# Patient Record
Sex: FEMALE | Race: OTHER | Employment: FULL TIME | URBAN - METROPOLITAN AREA
[De-identification: names, ages, dates, MRNs, and addresses within clinical notes are randomized per-mention and may not be internally consistent; named-entity substitution may affect disease eponyms.]

---

## 2023-06-23 ENCOUNTER — LAB ENCOUNTER (OUTPATIENT)
Dept: LAB | Facility: HOSPITAL | Age: 48
End: 2023-06-23
Attending: OBSTETRICS & GYNECOLOGY
Payer: MEDICAID

## 2023-06-23 ENCOUNTER — OFFICE VISIT (OUTPATIENT)
Dept: OBGYN CLINIC | Facility: CLINIC | Age: 48
End: 2023-06-23

## 2023-06-23 VITALS
BODY MASS INDEX: 30.4 KG/M2 | SYSTOLIC BLOOD PRESSURE: 120 MMHG | HEIGHT: 61 IN | WEIGHT: 161 LBS | DIASTOLIC BLOOD PRESSURE: 81 MMHG

## 2023-06-23 DIAGNOSIS — N92.0 MENORRHAGIA WITH REGULAR CYCLE: Primary | ICD-10-CM

## 2023-06-23 DIAGNOSIS — N92.0 MENORRHAGIA WITH REGULAR CYCLE: ICD-10-CM

## 2023-06-23 DIAGNOSIS — N92.0 EXCESSIVE OR FREQUENT MENSTRUATION: ICD-10-CM

## 2023-06-23 DIAGNOSIS — D25.1 FIBROIDS, INTRAMURAL: ICD-10-CM

## 2023-06-23 DIAGNOSIS — Z01.812 PRE-PROCEDURAL LABORATORY EXAMINATION: ICD-10-CM

## 2023-06-23 LAB
CONTROL LINE PRESENT WITH A CLEAR BACKGROUND (YES/NO): YES YES/NO
DEPRECATED RDW RBC AUTO: 44.6 FL (ref 35.1–46.3)
ERYTHROCYTE [DISTWIDTH] IN BLOOD BY AUTOMATED COUNT: 13.7 % (ref 11–15)
HCT VFR BLD AUTO: 42.6 %
HGB BLD-MCNC: 14.1 G/DL
KIT LOT #: NORMAL NUMERIC
MCH RBC QN AUTO: 29.5 PG (ref 26–34)
MCHC RBC AUTO-ENTMCNC: 33.1 G/DL (ref 31–37)
MCV RBC AUTO: 89.1 FL
PLATELET # BLD AUTO: 286 10(3)UL (ref 150–450)
PREGNANCY TEST, URINE: NEGATIVE
RBC # BLD AUTO: 4.78 X10(6)UL
WBC # BLD AUTO: 6.3 X10(3) UL (ref 4–11)

## 2023-06-23 PROCEDURE — 88305 TISSUE EXAM BY PATHOLOGIST: CPT | Performed by: OBSTETRICS & GYNECOLOGY

## 2023-06-23 PROCEDURE — 36415 COLL VENOUS BLD VENIPUNCTURE: CPT

## 2023-06-23 PROCEDURE — 85027 COMPLETE CBC AUTOMATED: CPT

## 2023-06-23 RX ORDER — NYSTATIN 100000 U/G
CREAM TOPICAL
COMMUNITY
Start: 2023-06-12

## 2023-06-23 NOTE — PROCEDURES
Endometrial Biopsy     Pre-Procedure Care:   Consent was obtained. Procedure/risks were explained. Questions were answered. Correct patient was identified. Correct side and site were confirmed. Pregnancy Results: negative from urine test   Birth control method(s) used: Indication: menorrhagia    Pre-Medications: The patient was premedicated with . Description of Procedure:   A bivalve speculum was placed in the vagina and the cervix was prepped with betadine solution. Single tooth tenaculum placed at the 12 o'clock position. The uterine cavity was sounded at 11 cm. The endometrial cavity was curetted for pipelle tissue sampling with 3 passes. Specimen was sent to pathology. The single tooth tenaculum was removed. Silver nitrate was applied at the site of tenaculum application   Good hemostasis was noted. There were no complications. There was no blood loss. Discharge instructions were provided to the patient.     Visit Plan:

## 2023-07-05 ENCOUNTER — OFFICE VISIT (OUTPATIENT)
Dept: OBGYN CLINIC | Facility: CLINIC | Age: 48
End: 2023-07-05

## 2023-07-05 ENCOUNTER — TELEPHONE (OUTPATIENT)
Dept: OBGYN CLINIC | Facility: CLINIC | Age: 48
End: 2023-07-05

## 2023-07-05 VITALS
WEIGHT: 163 LBS | DIASTOLIC BLOOD PRESSURE: 86 MMHG | BODY MASS INDEX: 30.78 KG/M2 | HEIGHT: 61 IN | SYSTOLIC BLOOD PRESSURE: 142 MMHG

## 2023-07-05 DIAGNOSIS — D25.1 FIBROIDS, INTRAMURAL: ICD-10-CM

## 2023-07-05 DIAGNOSIS — D25.1 FIBROIDS, INTRAMURAL: Primary | ICD-10-CM

## 2023-07-05 DIAGNOSIS — N84.0 ENDOMETRIAL POLYP: ICD-10-CM

## 2023-07-05 DIAGNOSIS — N92.0 MENORRHAGIA WITH REGULAR CYCLE: Primary | ICD-10-CM

## 2023-07-05 DIAGNOSIS — N92.0 MENORRHAGIA WITH REGULAR CYCLE: ICD-10-CM

## 2023-07-05 PROCEDURE — 3079F DIAST BP 80-89 MM HG: CPT | Performed by: OBSTETRICS & GYNECOLOGY

## 2023-07-05 PROCEDURE — 3077F SYST BP >= 140 MM HG: CPT | Performed by: OBSTETRICS & GYNECOLOGY

## 2023-07-05 PROCEDURE — 99213 OFFICE O/P EST LOW 20 MIN: CPT | Performed by: OBSTETRICS & GYNECOLOGY

## 2023-07-05 PROCEDURE — 3008F BODY MASS INDEX DOCD: CPT | Performed by: OBSTETRICS & GYNECOLOGY

## 2023-07-05 NOTE — TELEPHONE ENCOUNTER
Please schedule the following surgery:    Procedure: Robotic TLH with bilateral salpingectomy  Assist: (Y/N or none) donchev  Date: 7/26 if possible  Dx:  menorrhagia, fibroid,   Pre-op appt: (Y/N or n/a) n  Admission type: (IN/OUT/OBVS) out  Department of discharge(SDS/Floor):   Expected length of stay:   Procedure length time (please enter amount you are requesting): 2.5 hours  Recovery time (patients always ask): 2 to 6 wks  Medical Clearance: (Y/N) n  Special Requests: n      ALL Medicaid/including BCBS community: Tubal/ Hyst form MUST be signed (30 days):     Message to nurses:

## 2023-07-05 NOTE — PROGRESS NOTES
Jakob Quinones is a [age] old female  Patient's last menstrual period was 06/10/2023. Patient presents with:  Gyn Exam: Endometrial bx  and cbc 23     Recent endometrial bx showed endometrial polyp. She has 2 small fibroids. No anemia. Pt c/o menses 10 days long. Ultrasound shows fibroids  OBSTETRICS HISTORY:     OB History    Para Term  AB Living   2 2 2         SAB IAB Ectopic Multiple Live Births                  # Outcome Date GA Lbr Santhosh/2nd Weight Sex Delivery Anes PTL Lv   2 Term      CS-LTranv      1 Term      Vag-Spont          GYNE HISTORY:         Menarche: 12 (2023 11:10 AM)  Period Cycle (Days): 28 (2023 11:10 AM)  Period Duration (Days): 7 (2023 11:10 AM)  Period Flow: painful and heavy (2023 11:10 AM)  Use of Birth Control (if yes, specify type): None (2023 11:10 AM)  Hx Prior Abnormal Pap: No (2023 11:10 AM)         No data to display                  MEDICAL HISTORY:     History reviewed. No pertinent past medical history. SURGICAL HISTORY:     Past Surgical History:   Procedure Laterality Date     DELIVERY ONLY         SOCIAL HISTORY:     Social History     Socioeconomic History    Marital status: Single   Tobacco Use    Smoking status: Never    Smokeless tobacco: Never        FAMILY HISTORY:     History reviewed. No pertinent family history.     MEDICATIONS:       Current Outpatient Medications:     nystatin 100,000 Units/g External Cream, APPLY 1GM ON THE AFFECTED AREA 2 TIMES DAILY, Disp: , Rfl:     ALLERGIES:     Not on File      REVIEW OF SYSTEMS:     Constitutional:    denies fever / chills  Eyes:     denies blurred or double vision  Cardiovascular:  denies chest pain or palpitations  Respiratory:    denies shortness of breath  Gastrointestinal:  denies severe abdominal pain, frequent diarrhea or constipation, nausea / vomiting  Genitourinary:    denies dysuria, bothersome incontinence      PHYSICAL EXAM: Blood pressure 142/86, height 5' 1\" (1.549 m), weight 163 lb (73.9 kg), last menstrual period 06/10/2023. Constitutional:  well developed, well nourished  Head/Face:  normocephalic  Neck/Thyroid: thyroid symmetric, no thyromegaly, no nodules, no adenopathy  Lymphatic: no abnormal supraclavicular or axillary adenopathy is noted  Abdomen:   soft, nontender, nondistended, no masses        ASSESSMENT & PLAN:     [place] was seen today for gyn exam.    Diagnoses and all orders for this visit:    Fibroids, intramural    Menorrhagia with regular cycle    Endometrial polyp    Plan: offered ocp (but she might still have polyps) so declined, vs novasure (declined) vs TLH . She wants TLH with both tubes removed,and will preserve ovaries. No fam hx breast or ovarian cancer. Risks of bowel damage with laparoscopy after prior  accepted by pt. Will schedule. No anemia per lab      FOLLOW-UP     No follow-ups on file.       Ludwig Branham MD  2023

## 2023-07-06 NOTE — TELEPHONE ENCOUNTER
Called and spoke to pt using  #[id number]. Pt agrees with surgery date.  Major case letter sent to pt's home address.     -Amanda Gordon to assist  -need HFS hysterectomy consent

## 2023-07-17 RX ORDER — TERBINAFINE HYDROCHLORIDE 250 MG/1
250 TABLET ORAL DAILY
COMMUNITY

## 2023-07-18 ENCOUNTER — LAB ENCOUNTER (OUTPATIENT)
Dept: LAB | Age: 48
End: 2023-07-18
Attending: OBSTETRICS & GYNECOLOGY
Payer: MEDICAID

## 2023-07-18 DIAGNOSIS — Z01.818 PREOP TESTING: ICD-10-CM

## 2023-07-18 LAB
ANTIBODY SCREEN: NEGATIVE
RH BLOOD TYPE: NEGATIVE

## 2023-07-18 PROCEDURE — 86900 BLOOD TYPING SEROLOGIC ABO: CPT

## 2023-07-18 PROCEDURE — 86901 BLOOD TYPING SEROLOGIC RH(D): CPT

## 2023-07-18 PROCEDURE — 86850 RBC ANTIBODY SCREEN: CPT

## 2023-07-18 PROCEDURE — 36415 COLL VENOUS BLD VENIPUNCTURE: CPT

## 2023-07-19 LAB — RH BLOOD TYPE: NEGATIVE

## 2023-07-26 ENCOUNTER — ANESTHESIA EVENT (OUTPATIENT)
Dept: SURGERY | Facility: HOSPITAL | Age: 48
End: 2023-07-26
Payer: MEDICAID

## 2023-07-26 ENCOUNTER — HOSPITAL ENCOUNTER (OUTPATIENT)
Facility: HOSPITAL | Age: 48
Setting detail: HOSPITAL OUTPATIENT SURGERY
Discharge: HOME | End: 2023-07-26
Attending: OBSTETRICS & GYNECOLOGY | Admitting: OBSTETRICS & GYNECOLOGY
Payer: MEDICAID

## 2023-07-26 ENCOUNTER — ANESTHESIA (OUTPATIENT)
Dept: SURGERY | Facility: HOSPITAL | Age: 48
End: 2023-07-26
Payer: MEDICAID

## 2023-07-26 VITALS
HEIGHT: 61 IN | DIASTOLIC BLOOD PRESSURE: 79 MMHG | OXYGEN SATURATION: 99 % | HEART RATE: 89 BPM | SYSTOLIC BLOOD PRESSURE: 124 MMHG | RESPIRATION RATE: 14 BRPM | TEMPERATURE: 97 F | WEIGHT: 163 LBS | BODY MASS INDEX: 30.78 KG/M2

## 2023-07-26 DIAGNOSIS — Z01.818 PREOP TESTING: Primary | ICD-10-CM

## 2023-07-26 DIAGNOSIS — N92.0 MENORRHAGIA WITH REGULAR CYCLE: ICD-10-CM

## 2023-07-26 DIAGNOSIS — D25.1 FIBROIDS, INTRAMURAL: ICD-10-CM

## 2023-07-26 LAB — B-HCG UR QL: NEGATIVE

## 2023-07-26 PROCEDURE — 0UT94ZZ RESECTION OF UTERUS, PERCUTANEOUS ENDOSCOPIC APPROACH: ICD-10-PCS | Performed by: OBSTETRICS & GYNECOLOGY

## 2023-07-26 PROCEDURE — 58571 TLH W/T/O 250 G OR LESS: CPT | Performed by: OBSTETRICS & GYNECOLOGY

## 2023-07-26 PROCEDURE — 8E0W4CZ ROBOTIC ASSISTED PROCEDURE OF TRUNK REGION, PERCUTANEOUS ENDOSCOPIC APPROACH: ICD-10-PCS | Performed by: OBSTETRICS & GYNECOLOGY

## 2023-07-26 PROCEDURE — 0UT74ZZ RESECTION OF BILATERAL FALLOPIAN TUBES, PERCUTANEOUS ENDOSCOPIC APPROACH: ICD-10-PCS | Performed by: OBSTETRICS & GYNECOLOGY

## 2023-07-26 RX ORDER — HYDROCODONE BITARTRATE AND ACETAMINOPHEN 5; 325 MG/1; MG/1
1-2 TABLET ORAL EVERY 4 HOURS PRN
Qty: 12 TABLET | Refills: 0 | Status: SHIPPED | OUTPATIENT
Start: 2023-07-26

## 2023-07-26 RX ORDER — ONDANSETRON 4 MG/1
4 TABLET, FILM COATED ORAL EVERY 8 HOURS PRN
Status: CANCELLED | OUTPATIENT
Start: 2023-07-26

## 2023-07-26 RX ORDER — ACETAMINOPHEN 325 MG/1
650 TABLET ORAL EVERY 6 HOURS PRN
Status: CANCELLED | OUTPATIENT
Start: 2023-07-26

## 2023-07-26 RX ORDER — KETOROLAC TROMETHAMINE 30 MG/ML
30 INJECTION, SOLUTION INTRAMUSCULAR; INTRAVENOUS ONCE
Status: COMPLETED | OUTPATIENT
Start: 2023-07-26 | End: 2023-07-26

## 2023-07-26 RX ORDER — SODIUM CHLORIDE, SODIUM LACTATE, POTASSIUM CHLORIDE, CALCIUM CHLORIDE 600; 310; 30; 20 MG/100ML; MG/100ML; MG/100ML; MG/100ML
INJECTION, SOLUTION INTRAVENOUS CONTINUOUS
Status: DISCONTINUED | OUTPATIENT
Start: 2023-07-26 | End: 2023-07-26

## 2023-07-26 RX ORDER — HYDROMORPHONE HYDROCHLORIDE 1 MG/ML
0.6 INJECTION, SOLUTION INTRAMUSCULAR; INTRAVENOUS; SUBCUTANEOUS EVERY 5 MIN PRN
Status: DISCONTINUED | OUTPATIENT
Start: 2023-07-26 | End: 2023-07-26

## 2023-07-26 RX ORDER — METOCLOPRAMIDE 10 MG/1
10 TABLET ORAL ONCE
Status: COMPLETED | OUTPATIENT
Start: 2023-07-26 | End: 2023-07-26

## 2023-07-26 RX ORDER — IBUPROFEN 600 MG/1
600 TABLET ORAL EVERY 6 HOURS
Status: CANCELLED | OUTPATIENT
Start: 2023-07-26

## 2023-07-26 RX ORDER — HYDROCODONE BITARTRATE AND ACETAMINOPHEN 5; 325 MG/1; MG/1
1 TABLET ORAL EVERY 6 HOURS PRN
Status: CANCELLED | OUTPATIENT
Start: 2023-07-26

## 2023-07-26 RX ORDER — HYDROMORPHONE HYDROCHLORIDE 1 MG/ML
0.4 INJECTION, SOLUTION INTRAMUSCULAR; INTRAVENOUS; SUBCUTANEOUS EVERY 5 MIN PRN
Status: DISCONTINUED | OUTPATIENT
Start: 2023-07-26 | End: 2023-07-26

## 2023-07-26 RX ORDER — MORPHINE SULFATE 10 MG/ML
6 INJECTION, SOLUTION INTRAMUSCULAR; INTRAVENOUS EVERY 10 MIN PRN
Status: DISCONTINUED | OUTPATIENT
Start: 2023-07-26 | End: 2023-07-26

## 2023-07-26 RX ORDER — ONDANSETRON 2 MG/ML
4 INJECTION INTRAMUSCULAR; INTRAVENOUS EVERY 6 HOURS PRN
Status: DISCONTINUED | OUTPATIENT
Start: 2023-07-26 | End: 2023-07-26

## 2023-07-26 RX ORDER — MIDAZOLAM HYDROCHLORIDE 1 MG/ML
INJECTION INTRAMUSCULAR; INTRAVENOUS AS NEEDED
Status: DISCONTINUED | OUTPATIENT
Start: 2023-07-26 | End: 2023-07-26 | Stop reason: SURG

## 2023-07-26 RX ORDER — ONDANSETRON 2 MG/ML
INJECTION INTRAMUSCULAR; INTRAVENOUS AS NEEDED
Status: DISCONTINUED | OUTPATIENT
Start: 2023-07-26 | End: 2023-07-26 | Stop reason: SURG

## 2023-07-26 RX ORDER — HYDROCODONE BITARTRATE AND ACETAMINOPHEN 5; 325 MG/1; MG/1
1 TABLET ORAL ONCE
Status: COMPLETED | OUTPATIENT
Start: 2023-07-26 | End: 2023-07-26

## 2023-07-26 RX ORDER — NALOXONE HYDROCHLORIDE 0.4 MG/ML
80 INJECTION, SOLUTION INTRAMUSCULAR; INTRAVENOUS; SUBCUTANEOUS AS NEEDED
Status: DISCONTINUED | OUTPATIENT
Start: 2023-07-26 | End: 2023-07-26

## 2023-07-26 RX ORDER — MORPHINE SULFATE 4 MG/ML
4 INJECTION, SOLUTION INTRAMUSCULAR; INTRAVENOUS EVERY 10 MIN PRN
Status: DISCONTINUED | OUTPATIENT
Start: 2023-07-26 | End: 2023-07-26

## 2023-07-26 RX ORDER — DEXAMETHASONE SODIUM PHOSPHATE 4 MG/ML
VIAL (ML) INJECTION AS NEEDED
Status: DISCONTINUED | OUTPATIENT
Start: 2023-07-26 | End: 2023-07-26 | Stop reason: SURG

## 2023-07-26 RX ORDER — BUPIVACAINE HYDROCHLORIDE 5 MG/ML
INJECTION, SOLUTION EPIDURAL; INTRACAUDAL AS NEEDED
Status: DISCONTINUED | OUTPATIENT
Start: 2023-07-26 | End: 2023-07-26 | Stop reason: HOSPADM

## 2023-07-26 RX ORDER — HYDROMORPHONE HYDROCHLORIDE 1 MG/ML
0.2 INJECTION, SOLUTION INTRAMUSCULAR; INTRAVENOUS; SUBCUTANEOUS EVERY 5 MIN PRN
Status: DISCONTINUED | OUTPATIENT
Start: 2023-07-26 | End: 2023-07-26

## 2023-07-26 RX ORDER — PROCHLORPERAZINE EDISYLATE 5 MG/ML
5 INJECTION INTRAMUSCULAR; INTRAVENOUS EVERY 8 HOURS PRN
Status: DISCONTINUED | OUTPATIENT
Start: 2023-07-26 | End: 2023-07-26

## 2023-07-26 RX ORDER — HYDROCODONE BITARTRATE AND ACETAMINOPHEN 5; 325 MG/1; MG/1
2 TABLET ORAL EVERY 6 HOURS PRN
Status: CANCELLED | OUTPATIENT
Start: 2023-07-26

## 2023-07-26 RX ORDER — ONDANSETRON 2 MG/ML
4 INJECTION INTRAMUSCULAR; INTRAVENOUS EVERY 8 HOURS PRN
Status: CANCELLED | OUTPATIENT
Start: 2023-07-26

## 2023-07-26 RX ORDER — ACETAMINOPHEN 500 MG
1000 TABLET ORAL ONCE
Status: COMPLETED | OUTPATIENT
Start: 2023-07-26 | End: 2023-07-26

## 2023-07-26 RX ORDER — ROCURONIUM BROMIDE 10 MG/ML
INJECTION, SOLUTION INTRAVENOUS AS NEEDED
Status: DISCONTINUED | OUTPATIENT
Start: 2023-07-26 | End: 2023-07-26 | Stop reason: SURG

## 2023-07-26 RX ORDER — CEFAZOLIN SODIUM/WATER 2 G/20 ML
SYRINGE (ML) INTRAVENOUS AS NEEDED
Status: DISCONTINUED | OUTPATIENT
Start: 2023-07-26 | End: 2023-07-26 | Stop reason: SURG

## 2023-07-26 RX ORDER — IBUPROFEN 600 MG/1
600 TABLET ORAL EVERY 6 HOURS PRN
Qty: 20 TABLET | Refills: 0 | Status: SHIPPED | OUTPATIENT
Start: 2023-07-26

## 2023-07-26 RX ORDER — LIDOCAINE HYDROCHLORIDE 10 MG/ML
INJECTION, SOLUTION EPIDURAL; INFILTRATION; INTRACAUDAL; PERINEURAL AS NEEDED
Status: DISCONTINUED | OUTPATIENT
Start: 2023-07-26 | End: 2023-07-26 | Stop reason: SURG

## 2023-07-26 RX ORDER — MORPHINE SULFATE 4 MG/ML
2 INJECTION, SOLUTION INTRAMUSCULAR; INTRAVENOUS EVERY 10 MIN PRN
Status: DISCONTINUED | OUTPATIENT
Start: 2023-07-26 | End: 2023-07-26

## 2023-07-26 RX ORDER — FAMOTIDINE 20 MG/1
20 TABLET, FILM COATED ORAL ONCE
Status: COMPLETED | OUTPATIENT
Start: 2023-07-26 | End: 2023-07-26

## 2023-07-26 RX ADMIN — LIDOCAINE HYDROCHLORIDE 50 MG: 10 INJECTION, SOLUTION EPIDURAL; INFILTRATION; INTRACAUDAL; PERINEURAL at 10:33:00

## 2023-07-26 RX ADMIN — DEXAMETHASONE SODIUM PHOSPHATE 8 MG: 4 MG/ML VIAL (ML) INJECTION at 10:40:00

## 2023-07-26 RX ADMIN — MIDAZOLAM HYDROCHLORIDE 2 MG: 1 INJECTION INTRAMUSCULAR; INTRAVENOUS at 10:24:00

## 2023-07-26 RX ADMIN — SODIUM CHLORIDE, SODIUM LACTATE, POTASSIUM CHLORIDE, CALCIUM CHLORIDE: 600; 310; 30; 20 INJECTION, SOLUTION INTRAVENOUS at 11:30:00

## 2023-07-26 RX ADMIN — ONDANSETRON 4 MG: 2 INJECTION INTRAMUSCULAR; INTRAVENOUS at 11:40:00

## 2023-07-26 RX ADMIN — CEFAZOLIN SODIUM/WATER 2 G: 2 G/20 ML SYRINGE (ML) INTRAVENOUS at 10:40:00

## 2023-07-26 RX ADMIN — ROCURONIUM BROMIDE 50 MG: 10 INJECTION, SOLUTION INTRAVENOUS at 10:33:00

## 2023-07-26 RX ADMIN — ROCURONIUM BROMIDE 10 MG: 10 INJECTION, SOLUTION INTRAVENOUS at 12:17:00

## 2023-07-26 RX ADMIN — SODIUM CHLORIDE, SODIUM LACTATE, POTASSIUM CHLORIDE, CALCIUM CHLORIDE: 600; 310; 30; 20 INJECTION, SOLUTION INTRAVENOUS at 12:25:00

## 2023-07-26 RX ADMIN — ROCURONIUM BROMIDE 10 MG: 10 INJECTION, SOLUTION INTRAVENOUS at 11:40:00

## 2023-07-26 RX ADMIN — SODIUM CHLORIDE, SODIUM LACTATE, POTASSIUM CHLORIDE, CALCIUM CHLORIDE: 600; 310; 30; 20 INJECTION, SOLUTION INTRAVENOUS at 13:05:00

## 2023-07-26 RX ADMIN — SODIUM CHLORIDE, SODIUM LACTATE, POTASSIUM CHLORIDE, CALCIUM CHLORIDE: 600; 310; 30; 20 INJECTION, SOLUTION INTRAVENOUS at 10:49:00

## 2023-07-26 NOTE — OPERATIVE REPORT
[facility]    Operative Note         [address] [place] Location: [facility] [id number] MRN [id number]   Admission Date 7/26/2023 Operation Date 7/26/2023   Attending Physician Genevieve Haney MD       Patient Name: [facility] Abdi Frost     Preoperative Diagnosis: Menorrhagia with regular cycle [N92.0]  Fibroids, intramural [D25.1] , pelvic pain    Postoperative Diagnosis: Menorrhagia with regular cycle [N04. 0]Fibroids, intramural [D25.1]     Procedure(s):  Robotic total laparoscopic hysterectomy with bilateral salpingectomy     Primary Surgeon: Naeem Malave MD     Surgical Assistant.: Stevenson Campbell     Anesthesia: General     Specimen:   ID Type Source Tests Collected by Time Destination   1 : 1. UTERUS, CERVIX, AND BILATERAL TUBES Tissue Uterus and cervix, bilateral tube SURGICAL PATHOLOGY TISSUE Genevieve Haney MD 7/26/2023 12:01 PM         Estimated Blood Loss: Blood Output: 25 mL (7/26/2023 54:47 PM)       Complications: none      Indications for procedure:      Surgical Findings: large fibroid uterus     Complexity:  (optional)    Operative Summary:       Implants: * No implants in log *     Drains: none     Condition: stable       Naeem Malave MD

## 2023-07-26 NOTE — ANESTHESIA PROCEDURE NOTES
Airway  Date/Time: 7/26/2023 10:34 AM  Urgency: Elective    Airway not difficult    General Information and Staff    Patient location during procedure: OR  Anesthesiologist: Fermín Rios MD  Resident/CRNA: Jj Blank CRNA  Performed: CRNA   Performed by: Jj Blank CRNA  Authorized by: Fermín Rios MD      Indications and Patient Condition  Indications for airway management: anesthesia  Sedation level: deep  Preoxygenated: yes  Patient position: sniffing  Mask difficulty assessment: 1 - vent by mask    Final Airway Details  Final airway type: endotracheal airway      Successful airway: ETT  Cuffed: yes   Successful intubation technique: direct laryngoscopy  Facilitating devices/methods: intubating stylet and cricoid pressure  Endotracheal tube insertion site: oral  Blade: Lisy  Blade size: #3  ETT size (mm): 7.0    Cormack-Lehane Classification: grade I - full view of glottis  Placement verified by: capnometry   Cuff volume (mL): 6  Measured from: teeth  ETT to teeth (cm): 19  Number of attempts at approach: 1

## 2023-07-27 NOTE — OPERATIVE REPORT
[facility]    PATIENT'S NAME: HERB Dejesus   ATTENDING PHYSICIAN: John France MD   OPERATING PHYSICIAN: John France MD   PATIENT ACCOUNT#:   [de-identified]    LOCATION:  [place] [address]  MEDICAL RECORD #:   [id number]       YOB: 1975  ADMISSION DATE:       07/26/2023      OPERATION DATE:  07/26/2023    OPERATIVE REPORT    #####EDITING MAY BE REQUIRED#####    PREOPERATIVE DIAGNOSIS:  Menorrhagia, fibroid, pelvic pain. POSTOPERATIVE DIAGNOSIS:  Menorrhagia, fibroid, pelvic pain. PROCEDURE:  Robotic total laparoscopic hysterectomy with bilateral salpingectomy. ASSISTANT:  VINOD Pritchett    ANESTHESIA:  General.    QUANTITATIVE BLOOD LOSS:  25 mL. COMPLICATIONS:  None. INDICATIONS:  Patient with a history of menorrhagia, pelvic pain, and fibroids. She consented for TLH and bilateral salpingectomy. OPERATIVE TECHNIQUE:   Patient taken to the operating room, given general anesthesia and placed in the dorsal lithotomy position, prepped and draped vaginally and abdominally. Vaginally, the Man catheter inserted after extra Betadine was added to the vagina. The anterior lip of the cervix was grasped with a single-tooth tenaculum and a 4 size Koh ring fit well on the cervix. Therefore, the cervix was dilated to #8 Hegar and sounded to 10 cm. Therefore, the 8 cm JENNIFER manipulator was attached to the 4 cm Koh ring, and this was inserted into the fundus, and the 6 mL normal saline balloon syringe was inflated. The single-tooth tenaculum was removed, and I pushed forward the Koh ring over the cervix. Attention was then turned to the abdomen. A supraumbilical incision was made with a scalpel, and the Veress needle inserted and abdomen filled with 5 L of CO2 followed by the 8 mm cannula robotic. The scope was inserted. There were some adhesions between the uterus and the anterior peritoneum.   I then placed the patient in steep Trendelenburg and placed a right-sided 8 mm port with a scalpel under direct visualization and a left-sided 8 mm port, and a left upper quadrant 8 mm AirSeal port which was used throughout. I then docked the robot and went to the console. With the vessel sealer in my right hand, I used a left arm was 1, arm 2 was the camera arm, 3 was the right quadrant, and assistant port was in the left upper quadrant and triangulated. With the vessel sealer in the right hand and the fenestrated bipolar in the left hand, I noticed that there was adhesion between the uterus and the anterior abdominal wall near the bladder. I removed the uterus _______  bladder using the vessel sealer. I then noticed there were a lot of adhesions between the bladder and the lower uterine segment, and I backfilled the bladder with 150 mL, and I saw where the junction was between the bladder and the lower uterine segment, and I used the scissors. Using the monopolar, I incised above the level of the bladder insertion into the cervix, and then I pulled the bladder down below the [facility] as I was incising with 1 tip of the monopolar scissors using cutting mode. I then put the vessel sealer back in my right hand and went across the proper ovarian ligament on the right side and then across the round ligament on the right side, then across the broad ligament on the right side, then down across the right ureter, always pushing up on the [facility] ring. I did not cut, because I went to the other side where I put the vessel sealer in my left hand and the fenestrated bipolar in the my right, and I similarly went across the proper ovarian ligament, across the left round ligament, down to through the broad ligament, and angled the right end over the ureter above the Koh ring. I then went to the right side and did cut and incise that uterine artery as well.   I then with scissors in the my right hand and the bipolar in my left hand, I went posteriorly and incised under the [facility] ring using a paint brush motion. I incised the end of the end of the [facility] ring, and then I followed by [facility] circumferentially using the monopolar scissors using coagulation mode. I was able to separate the uterus and cervix and vagina. The uterus was enlarged. Therefore, vaginally I had to grasp it with a single-tooth tenaculum, and I grabbed the posterior lower uterine segment then kept tumbling until I was able to remove the uterus intact without having to morcellate. I sent this as a specimen. I put a light gannon with a sponge inside to maintain pneumoperitoneum in the vagina as I went back to the console, and I was able to laparoscopically close the cuff after suction and irrigation of the pelvis was undertaken. Good hemostasis noted. I used the 9-inch 90 2-0 V-Loc and I started from the patient's left side, I got the corner anterior vaginal mucosa, posterior vaginal mucosa, posterior peritoneum and then I ran it from the patient's left to the right side anterior vaginal mucosa, posterior vaginal mucosa, posterior peritoneum including the uterosacral ligaments. I went _______ on the right side corner. I then ran the suture back toward the midline 2 times and cut the string of the V-Loc. I suction irrigated. Good hemostasis was noted. Both ovaries were normal.  I then removed the fallopian tubes with the vessel sealer in my right hand and the needle gannon in my left. For the suture portion, I used the aury needle gannon 9 cutting for manipulation of the V-Loc. I then removed the tubes with the vessel sealer in their entirety, and they were pulled out through the assistant port and sent to Pathology. Good hemostasis was noted. The ovaries were normal.  We suctioned and irrigated the pelvis, and good hemostasis was noted.   Vaginally, I then inserted a new 5 mm scope after I had removed the Man through the urethra and into the bladder after giving 1 mL sodium fluorescein and I saw green urine effluxing through both ureteral orifices. I then reinserted the Man and inspected the vagina. There was a small laceration on the left mid vagina which was sutured with interrupted 3-0 Vicryl on an SH needle. In the midline on the lower uterine segment posterior there was also a laceration which was sutured with interrupted 3-0 Vicryl on an SH needle as well. These oozings were from when we pulled the uterus out through the vagina. After that, good hemostasis was noted from the cuff. The 4 incision sites after the ports were removed were closed with interrupted 5-0 Monocryl. Sponge, lap, and needle counts correct x2. The patient tolerated the procedure well.       Dictated By Jarad Camacho MD  d: 07/26/2023 13:12:22  t: 07/26/2023 16:46:00  Job [id number]  ANGELIAY/

## 2023-07-28 PROBLEM — D25.1 FIBROIDS, INTRAMURAL: Status: ACTIVE | Noted: 2023-07-28

## 2023-07-28 PROBLEM — N92.4 MENORRHAGIA, PREMENOPAUSAL: Status: ACTIVE | Noted: 2023-07-28

## 2023-08-01 ENCOUNTER — MED REC SCAN ONLY (OUTPATIENT)
Dept: OBGYN CLINIC | Facility: CLINIC | Age: 48
End: 2023-08-01

## 2023-08-03 ENCOUNTER — TELEPHONE (OUTPATIENT)
Dept: OBGYN CLINIC | Facility: CLINIC | Age: 48
End: 2023-08-03

## 2023-08-03 NOTE — TELEPHONE ENCOUNTER
Patient name and  verified. Patient scheduled for postop with GERARDO on 23. Aware of scheduling details.

## 2023-08-03 NOTE — TELEPHONE ENCOUNTER
Pt scheduled 2 week Post Op appointment for 8/31 (soonest available). Surgery was 7/26. If this is too late, please reschedule with Pt. Needs .

## 2023-08-08 ENCOUNTER — TELEPHONE (OUTPATIENT)
Dept: OBGYN CLINIC | Facility: CLINIC | Age: 48
End: 2023-08-08

## 2023-08-08 NOTE — TELEPHONE ENCOUNTER
Mona Benson from [employer]illing wants to know if pt has a medicaid hysterectomy form for hysterectomy on 7/26

## 2023-08-09 ENCOUNTER — OFFICE VISIT (OUTPATIENT)
Dept: OBGYN CLINIC | Facility: CLINIC | Age: 48
End: 2023-08-09

## 2023-08-09 VITALS
SYSTOLIC BLOOD PRESSURE: 108 MMHG | WEIGHT: 157 LBS | DIASTOLIC BLOOD PRESSURE: 63 MMHG | HEIGHT: 63 IN | BODY MASS INDEX: 27.82 KG/M2

## 2023-08-09 DIAGNOSIS — Z09 POSTOP CHECK: Primary | ICD-10-CM

## 2023-08-09 PROCEDURE — 3078F DIAST BP <80 MM HG: CPT | Performed by: OBSTETRICS & GYNECOLOGY

## 2023-08-09 PROCEDURE — 3008F BODY MASS INDEX DOCD: CPT | Performed by: OBSTETRICS & GYNECOLOGY

## 2023-08-09 PROCEDURE — 3074F SYST BP LT 130 MM HG: CPT | Performed by: OBSTETRICS & GYNECOLOGY

## 2023-08-09 NOTE — PROGRESS NOTES
Dania Sutton is a 53year old female  Patient's last menstrual period was 07/10/2023. Patient presents with:  Post-Op: Robotic total laparoscopic hysterectomy with bilateral salpingectomy    Pt 14 days post tlh  OBSTETRICS HISTORY:     OB History    Para Term  AB Living   2 2 2         SAB IAB Ectopic Multiple Live Births                  # Outcome Date GA Lbr Santhosh/2nd Weight Sex Delivery Anes PTL Lv   2 Term 2012     CS-LTranv      1 Term 0     Vag-Spont          GYNE HISTORY:         Menarche: 12 (2023 11:10 AM)  Period Cycle (Days): 28 (2023 11:10 AM)  Period Duration (Days): 7 (2023 11:10 AM)  Period Flow: painful and heavy (2023 11:10 AM)  Use of Birth Control (if yes, specify type): None (2023 11:10 AM)  Hx Prior Abnormal Pap: No (2023 11:10 AM)         No data to display                  MEDICAL HISTORY:     Past Medical History:   Diagnosis Date    History of blood transfusion     age 3       SURGICAL HISTORY:     Past Surgical History:   Procedure Laterality Date     DELIVERY ONLY      HYSTERECTOMY  2023    Robotic total laparoscopic hysterectomy with bilateral salpingectomy    TONSILLECTOMY         SOCIAL HISTORY:     Social History     Socioeconomic History    Marital status: Single   Tobacco Use    Smoking status: Never    Smokeless tobacco: Never   Vaping Use    Vaping Use: Never used   Substance and Sexual Activity    Alcohol use: Never    Drug use: Never        FAMILY HISTORY:     Family History   Problem Relation Age of Onset    No Known Problems Father     Diabetes Mother        MEDICATIONS:       Current Outpatient Medications:     ibuprofen 600 MG Oral Tab, Take 1 tablet (600 mg total) by mouth every 6 (six) hours as needed for Pain., Disp: 20 tablet, Rfl: 0    terbinafine 250 MG Oral Tab, Take 1 tablet (250 mg total) by mouth daily.  (Patient not taking: Reported on 2023), Disp: , Rfl:     ALLERGIES:     No Known Allergies      REVIEW OF SYSTEMS:     Constitutional:    denies fever / chills  Eyes:     denies blurred or double vision  Cardiovascular:  denies chest pain or palpitations  Respiratory:    denies shortness of breath  Gastrointestinal:  denies severe abdominal pain, frequent diarrhea or constipation, nausea / vomiting  Genitourinary:    denies dysuria, bothersome incontinence  Skin/Breast:   denies any breast pain, lumps, or discharge  Neurological:    denies frequent severe headaches  Psychiatric:   denies depression or anxiety, thoughts of harming self or others  Heme/Lymph:    denies easy bruising or bleeding      PHYSICAL EXAM:   Blood pressure 108/63, height 5' 3\" (1.6 m), weight 157 lb (71.2 kg), last menstrual period 07/10/2023. Constitutional:  well developed, well nourished  Head/Face:  normocephalic  Neck/Thyroid: thyroid symmetric, no thyromegaly, no nodules, no adenopathy  Lymphatic: no abnormal supraclavicular or axillary adenopathy is noted  Breast:   normal without palpable masses, tenderness, asymmetry, nipple discharge, nipple retraction or skin changes  Abdomen:   soft, nontender, nondistended, no masses, incision healed x 4  Skin/Hair:  no unusual rashes or bruises  Extremities:  no edema, no cyanosis, non tender bilaterally  Psychiatric:   oriented to time, place, person and situation. Appropriate mood and affect          ASSESSMENT & PLAN:     [place] was seen today for post-op. Diagnoses and all orders for this visit:    Postop check  Rtc 4 wks, no c/o        FOLLOW-UP     Return in about 4 weeks (around 9/6/2023) for postop visit.       Chika Falcon MD  8/9/2023

## 2023-09-01 ENCOUNTER — OFFICE VISIT (OUTPATIENT)
Dept: OBGYN CLINIC | Facility: CLINIC | Age: 48
End: 2023-09-01

## 2023-09-01 VITALS
WEIGHT: 157 LBS | SYSTOLIC BLOOD PRESSURE: 110 MMHG | DIASTOLIC BLOOD PRESSURE: 72 MMHG | HEIGHT: 61 IN | BODY MASS INDEX: 29.64 KG/M2

## 2023-09-01 DIAGNOSIS — Z09 POSTOP CHECK: Primary | ICD-10-CM

## 2023-09-01 PROCEDURE — 3008F BODY MASS INDEX DOCD: CPT | Performed by: OBSTETRICS & GYNECOLOGY

## 2023-09-01 PROCEDURE — 3074F SYST BP LT 130 MM HG: CPT | Performed by: OBSTETRICS & GYNECOLOGY

## 2023-09-01 PROCEDURE — 3078F DIAST BP <80 MM HG: CPT | Performed by: OBSTETRICS & GYNECOLOGY

## 2024-04-03 ENCOUNTER — TELEPHONE (OUTPATIENT)
Dept: OBGYN CLINIC | Facility: CLINIC | Age: 49
End: 2024-04-03

## 2024-04-03 ENCOUNTER — OFFICE VISIT (OUTPATIENT)
Dept: OBGYN CLINIC | Facility: CLINIC | Age: 49
End: 2024-04-03
Payer: MEDICAID

## 2024-04-03 VITALS
WEIGHT: 153 LBS | SYSTOLIC BLOOD PRESSURE: 121 MMHG | BODY MASS INDEX: 29 KG/M2 | DIASTOLIC BLOOD PRESSURE: 72 MMHG | HEART RATE: 82 BPM

## 2024-04-03 DIAGNOSIS — Z12.31 BREAST CANCER SCREENING BY MAMMOGRAM: ICD-10-CM

## 2024-04-03 DIAGNOSIS — Z01.419 NORMAL GYNECOLOGIC EXAMINATION: Primary | ICD-10-CM

## 2024-04-03 DIAGNOSIS — N75.0 CYST OF LEFT BARTHOLIN'S GLAND: ICD-10-CM

## 2024-04-03 DIAGNOSIS — N75.0 CYST OF LEFT BARTHOLIN'S GLAND: Primary | ICD-10-CM

## 2024-04-03 PROCEDURE — 99396 PREV VISIT EST AGE 40-64: CPT | Performed by: OBSTETRICS & GYNECOLOGY

## 2024-04-03 NOTE — PROGRESS NOTES
Chyna Frost is a 49 year old female  Patient's last menstrual period was 07/10/2023.   Chief Complaint   Patient presents with    Annual     Pt presents for annual exam, c/o vaginal bump   No vaginal discharge. S/p tlh.     OBSTETRICS HISTORY:     OB History    Para Term  AB Living   3 2 2   1     SAB IAB Ectopic Multiple Live Births   1              # Outcome Date GA Lbr Santhosh/2nd Weight Sex Delivery Anes PTL Lv   3 Term 2012     CS-LTranv      2 SAB 2010           1 Term      Vag-Spont          GYNE HISTORY:     Hx Prior Abnormal Pap: No  Pap Result Notes:  normal per pt   Menarche: 13 (4/3/2024 10:03 AM)  Period Cycle (Days): Hysterectomy (4/3/2024 10:03 AM)  Period Duration (Days): 7 (2023 11:10 AM)  Period Flow: painful and heavy (2023 11:10 AM)  Use of Birth Control (if yes, specify type): Hysterectomy (4/3/2024 10:03 AM)  Hx Prior Abnormal Pap: No (4/3/2024 10:03 AM)  Pap Result Notes:  normal per pt (4/3/2024 10:03 AM)         No data to display                  MEDICAL HISTORY:     Past Medical History:   Diagnosis Date    History of blood transfusion     age 3       SURGICAL HISTORY:     Past Surgical History:   Procedure Laterality Date     DELIVERY ONLY      HYSTERECTOMY  2023    Robotic total laparoscopic hysterectomy with bilateral salpingectomy    TONSILLECTOMY         SOCIAL HISTORY:     Social History     Socioeconomic History    Marital status: Single   Tobacco Use    Smoking status: Never    Smokeless tobacco: Never   Vaping Use    Vaping Use: Never used   Substance and Sexual Activity    Alcohol use: Never    Drug use: Never        FAMILY HISTORY:     Family History   Problem Relation Age of Onset    No Known Problems Father     Diabetes Mother        MEDICATIONS:     No current outpatient medications on file.    ALLERGIES:     No Known Allergies      REVIEW OF SYSTEMS:     Constitutional:    denies fever / chills  Eyes:      denies blurred or double vision  Cardiovascular:  denies chest pain or palpitations  Respiratory:    denies shortness of breath  Gastrointestinal:  denies severe abdominal pain, frequent diarrhea or constipation, nausea / vomiting  Genitourinary:    denies dysuria, bothersome incontinence  Skin/Breast:   denies any breast pain, lumps, or discharge  Neurological:    denies frequent severe headaches  Psychiatric:   denies depression or anxiety, thoughts of harming self or others  Heme/Lymph:    denies easy bruising or bleeding      PHYSICAL EXAM:   Blood pressure 121/72, pulse 82, weight 153 lb (69.4 kg), last menstrual period 07/10/2023.  Constitutional:  well developed, well nourished  Head/Face:  normocephalic  Neck/Thyroid: thyroid symmetric, no thyromegaly, no nodules, no adenopathy  Lymphatic: no abnormal supraclavicular or axillary adenopathy is noted  Respiratory:      chest wall symmetric and nontender on palpation, clear to asculation bilateral, no wheezing, rales, ronchi, and resonance normal upon percussion  Cardiovascular: chest normal in appearance, regular rate and rhythm, no murmurs, PMI palpated midclavicular line  Breast:   normal without palpable masses, tenderness, asymmetry, nipple discharge, nipple retraction or skin changes  Abdomen:   soft, nontender, nondistended, no masses  Skin/Hair:  no unusual rashes or bruises  Extremities:  no edema, no cyanosis, non tender bilaterally  Psychiatric:   oriented to time, place, person and situation. Appropriate mood and affect    Pelvic Exam:  External Genitalia:  normal appearance, hair distribution, and no lesions, left bartholins cyst, 3x3 cm , mildly tender (pt states has had for 6 mos)  Urethral Meatus:   normal in size, location, without lesions   Bladder:    no fullness, masses or tenderness  Vagina:    normal appearance without lesions, no abnormal discharge  Sve: no pelvic masses    ASSESSMENT & PLAN:     Chyna was seen today for  annual.    Diagnoses and all orders for this visit:    Normal gynecologic examination  -     ThinPrep Pap with HPV Reflex, Chlamydia/GC; Future  -     Chlamydia/Gc Amplification; Future  Nutrition, weight screening and exercise were discussed with the patient.  Exercise should encompass approximately 150 minutes/week.  Self breast exam counseling was also given.  I advised the patient to avoid tobacco, drugs and alcohol.  Influenza vaccine was offered if seasonally appropriate.  HPV and STD prevention counseling was given.  Health maintenance checklist  was reviewed including Pap smear, cervical cultures, and mammogram screening if age-appropriate.  Appropriate follow-up scheduling was discussed with the patient.    Pap cuff done  Breast cancer screening by mammogram  -     [facility] EDUARDO 2D+3D SCREENING BILAT (CPT=77067/36480); Future    Cyst of left Bartholin's gland  I recommend surgical excision under general . Pt agreed . D/w pt marsupialization and that this can recur after surgery and that hematoma is  a risks of this surgery. She agreed to the risks. Will schedule        FOLLOW-UP     Return in about 1 year (around 4/3/2025) for Annual exam.      Yannick Tobar MD  4/3/2024

## 2024-04-04 LAB
C TRACH DNA SPEC QL NAA+PROBE: NEGATIVE
N GONORRHOEA DNA SPEC QL NAA+PROBE: NEGATIVE

## 2024-04-04 NOTE — TELEPHONE ENCOUNTER
SX scheduled for Tuesday, 5/7/2024 @ 11:00am with GERARDO.     Informed patient of date and time of sx.  Will mail minor case letter to patient.       Assist pending  Prior authorization pending      Placed in SX book.

## 2024-04-19 ENCOUNTER — TELEPHONE (OUTPATIENT)
Dept: OBGYN CLINIC | Facility: CLINIC | Age: 49
End: 2024-04-19

## 2024-04-25 LAB — HPV I/H RISK 1 DNA SPEC QL NAA+PROBE: NEGATIVE

## 2024-05-06 NOTE — DISCHARGE INSTRUCTIONS
CIRUGIA AMBULATORIA: INSTRUCCIONES DESPUES DE MERY RECIBIDO ANESTESIA  Debido a la Anestesia y a las medicamentos que se le aplicaron roly la cirugia, keena reflejos y capacidaddiscernimiento pueden verse afectados. Tambien podria tener un poco de mareo.Aparte de siguir las precauciones de sentico comun, le recomendamos lo siguiente:     El paciente debe estar acompañado por alguien hasta la mañana siguiente.     No maneje ningun vehiculo automotor ni monte bicicleta.     No tome ninguna decision importante katie por ejemplo firmar de documentos importantes.     No opere herramientas electricas ni electrodomesticos, tales katie cuchillos electricos, batidoras electricas o serruchos electricos. No clarisa el cesped con cortadoras electricas. No practique deportes.     No josse ejercicio.     Para evitar las nauseas, coma menos de lo normal mas o menos la mitad de lo habitual y/o mynor solo liquidos hasta la manana siguiente. Consulte con pierre doctor si esta llevando dane dieta especial.     No tome bebidas alcoholicas, tranquilizantes, pastilles para dormir etc., y verifique con pierre doctor acerca de cualquier medicamento que abad tomando actualmente.     El efecto de la medicación usada en pierre anestesia habra pasado brook por completo a la medianoche. Por lo tanto, puede reanudar keena habitos cotidianos en la mañana.     Los adultos deben descansar lo heather posible por las siguientes 24 horas. Los niños deben permanecer en cama lo heather posible por las siguientes 24 horas.     Si se presenta cualquier problema, puede llamar a pierre propio doctor personal o aceda al centro de [facility].    Si sigue estas instrucciones, se sentira major y estara mas seguro despues de pierre cirugia ambulatoria. Sitiene cualquier pregunta, llame al hospital y pida que lo comuniquen con la enfermera de cirugia [phone], extension [phone].    Instrucciones para el farrah: después de pierre cirugía   Acaba de  someterse a dane cirugía. Roly la cirugía, le administraron un tipo de medicamento llamado anestesia para que esté relajado y no sienta dolor. Después de la cirugía, walter vez sienta algo de dolor o náuseas. [place] es común. Estos son algunos consejos para sentirse mejor y recuperarse sheldon después de la cirugía.   El regreso a casa  Pierre proveedor de atención médica le enseñará cómo cuidarse cuando regrese a pierre casa. También responderá keena preguntas. Pida a un familiar o amigo adulto que lo conduzca a pierre casa. Roly las primeras 24 horas después de la cirugía, siga estas recomendaciones:   No conduzca ni use maquinaria pesada.  No tome decisiones importantes ni firme ningún documento legal.  Adminístrese los medicamentos según las indicaciones.  Evite el consumo de alcohol.  Si es necesario, coordine para que alguien se quede con usted. Esta persona puede vigilar cualquier problema que se presente y lo ayudará a permanecer seguro.  Asegúrese de asistir a todas keena visitas de control con pierre proveedor de atención médica. Y descanse después de la cirugía roly el tiempo que le indique pierre proveedor.   Cómo sobrellevar el dolor  Si siente dolor después de la cirugía, los analgésicos lo ayudarán a sentirse mejor. [place] los analgésicos según las indicaciones, antes de que el dolor se intensifique. Además, pregunte a pierre proveedor de atención médica o al farmacéutico acerca de otras formas de controlar el dolor. Estas podrían incluir aplicar calor o hielo, o hacer ejercicios de relajación. Y siga todas las instrucciones que le dé pierre cirujano o enfermero.      Cumpla el cronograma de keena medicamentos.     Consejos para vaishali analgésicos  Para aliviar el dolor lo jacque posible, recuerde estos puntos:   Los analgésicos pueden causar malestar estomacal. Tomarlos con un poco de comida puede aliviar abad efecto.  La mayoría de los calmantes que se hunter por la boca necesitan por lo menos de 20 a 30 minutos para surtir efecto.  No  espere hasta que pierre dolor se vuelva intenso para grzegorz el analgésico que le indicaron. Intente que el momento en que puede grzegorz pierre medicamento coincida con otra actividad. [place] podría ser el momento antes de vestirse, sera un paseo o sentarse a la gongora para cenar.  El estreñimiento es un efecto secundario frecuente de algunos analgésicos. Consulte a pierre proveedor de atención médica antes de usar cualquier medicamento, katie laxantes o ablandadores de heces, para ayudar a aliviar el estreñimiento. También consulte si es preciso evitar algún tipo de alimento. Grzegorz mucha cantidad de líquido y comer alimentos katie frutas y verduras con alto contenido de fibra también puede ser beneficioso. Recuerde que no debe grzegorz laxantes a menos que pierre cirujano se los indique.  Mezclar bebidas alcohólicas y analgésicos puede causar mareos y enlentecer pierre respiración. Y hasta puede ser mortal. No mynor alcohol mientras esté tomando calmantes.  Los analgésicos pueden hacer que tenga reacciones más lentas. No conduzca ni opere maquinaria mientras esté tomando analgésicos.  Pierre proveedor de atención médica puede indicarle que tome acetaminofén (paracetamol) para ayudar a aliviar el dolor. Pregúntele qué cantidad debe grzegorz por día. El acetaminofén y otros analgésicos pueden interactuar con keena medicamentos recetados u otros medicamentos de venta lore (OTC, por keena siglas en inglés). Algunos medicamentos recetados contienen acetaminofén y otros ingredientes. Combinar medicamentos recetados y acetaminofén de venta lore para aliviar el dolor puede provocarle dane sobredosis accidental. [place]cy atentamente la etiqueta del envase de keena medicamentos OTC. [place] lo ayudará a saber con exactitud la lista de ingredientes, la cantidad que debe grzegorz y cualquier advertencia. [place] también puede ayudarlo a evitar grzegorz demasiado acetaminofén. Si tiene preguntas o no entiende la información, pídale a pierre farmacéutico o proveedor de atención médica que se la  explique antes de vaishali el medicamento OTC.   Manejo de las náuseas  Algunas personas pueden sentir malestar estomacal (náuseas) después de la cirugía. [place] suele suceder debido a la anestesia, el dolor, los analgésicos, la disminución del movimiento de la comida en el estómago o el estrés de la cirugía. Estos consejos lo ayudarán a manejar las náuseas y a comer alimentos más saludables mientras se recupera. Si seguía un plan alimentario especial antes de la cirugía, pregúntele a pierre proveedor de atención médica si debe continuarlo mientras se recupera. Consulte con pierre proveedor cómo debería continuar pierre alimentación. Esta puede variar según el tipo de cirugía a la que se sometió. Los siguientes consejos generales pueden serle útiles:   No se fuerce a comer. [place] por pierre cuerpo para saber cuándo comer y qué cantidad.  Comience con líquidos transparentes y sopa. Estos son más fáciles de digerir.  Tan pronto katie se sienta listo, intente comer alimentos semisólidos. Estos incluyen puré de shelly, puré de manzana y gelatina.  Lentamente, pase a alimentos sólidos. Al principio no coma alimentos grasosos, pesados ni condimentados.  No se fuerce a hacer shiv comidas grandes al día. En cambio, coma cantidades pequeñas, anaya con mayor frecuencia.  [place] los analgésicos con dane pequeña cantidad de alimentos sólidos, katie galletas saladas o dane tostada. [place] ayuda a prevenir las náuseas.  Cuándo llamar a pierre proveedor de atención médica   Llame de inmediato a pierre proveedor de atención médica si nota alguno de los siguientes síntomas:   Sigue teniendo mucho dolor, o el dolor empeora, después de vaishali el medicamento. Puede que el medicamento no sea lo suficientemente yosvany. O sheldon, puede maira complicaciones de la cirugía.  Se siente demasiado somnoliento, mareado o adormecido. Quizás el medicamento sea demasiado yosvany.  Tiene efectos secundarios, katie náuseas o vómitos. Pierre proveedor de atención médica puede recomendarle vaishali  otros medicamentos.  Tiene cambios en la piel, katie sarpullido, picazón o urticaria. [place] puede significar que tiene dane reacción alérgica. Pierre proveedor puede recomendarle vaishali otros medicamentos.  La incisión tiene un aspecto diferente (por ejemplo, se abre dane parte).  Tiene sangrado o supuración de líquido de la herida y no le dijeron que eso era esperable.  Fiebre de 100.4 °F (38 °C) o más, o según le indique pierre proveedor.  Cuándo llamar al 911  Llame al  911  de inmediato si tiene:   Dificultad para respirar  Vanessa hinchada    Si tiene apnea del sueño obstructiva   [place]ana la cirugía, le administraron anestesia para que esté cómodo y no sienta dolor. Después de la cirugía, es probable que tenga más ataques de apnea causados por la anestesia y otros medicamentos que le administraron. Los ataques pueden durar más de lo habitual.    En pierre casa, josse lo siguiente:  Cuando duerma, siga usando pierre dispositivo de presión positiva continua en las vías respiratorias (CPAP, por keena siglas en inglés). A menos que pierre proveedor de atención médica le indique lo contrario, úselo siempre que duerma, ya sea de día o de noche. El dispositivo de CPAP suele usarse para tratar la apnea obstructiva del sueño.  Consulte a pierre proveedor antes de vaishali cualquier analgésico, relajante muscular o sedante. Pierre proveedor le dará información sobre los peligros de vaishali estos medicamentos.  Comuníquese con pierre proveedor si tiene el sueño demasiado alterado, incluso cuando esté tomando los medicamentos según las instrucciones.  © 7131-2813 [employer]. Todos los derechos reservados. Esta información no pretende sustituir la atención médica profesional. Sólo pierre médico puede diagnosticar y tratar un problema de eitan.

## 2024-05-07 ENCOUNTER — ANESTHESIA EVENT (OUTPATIENT)
Dept: PREOP | Facility: HOSPITAL | Age: 49
End: 2024-05-07
Payer: MEDICAID

## 2024-05-07 ENCOUNTER — ANESTHESIA (OUTPATIENT)
Dept: PREOP | Facility: HOSPITAL | Age: 49
End: 2024-05-07
Payer: MEDICAID

## 2024-05-07 ENCOUNTER — HOSPITAL ENCOUNTER (OUTPATIENT)
Facility: HOSPITAL | Age: 49
Setting detail: HOSPITAL OUTPATIENT SURGERY
Discharge: HOME | End: 2024-05-07
Attending: OBSTETRICS & GYNECOLOGY | Admitting: OBSTETRICS & GYNECOLOGY
Payer: MEDICAID

## 2024-05-07 VITALS
HEIGHT: 61 IN | RESPIRATION RATE: 18 BRPM | BODY MASS INDEX: 27.7 KG/M2 | OXYGEN SATURATION: 100 % | TEMPERATURE: 98 F | DIASTOLIC BLOOD PRESSURE: 90 MMHG | WEIGHT: 146.69 LBS | HEART RATE: 63 BPM | SYSTOLIC BLOOD PRESSURE: 111 MMHG

## 2024-05-07 PROBLEM — N75.0 BARTHOLIN'S CYST: Status: ACTIVE | Noted: 2024-05-07

## 2024-05-07 PROCEDURE — 99213 OFFICE O/P EST LOW 20 MIN: CPT | Performed by: OBSTETRICS & GYNECOLOGY

## 2024-05-07 RX ORDER — SODIUM CHLORIDE, SODIUM LACTATE, POTASSIUM CHLORIDE, CALCIUM CHLORIDE 600; 310; 30; 20 MG/100ML; MG/100ML; MG/100ML; MG/100ML
INJECTION, SOLUTION INTRAVENOUS CONTINUOUS
Status: DISCONTINUED | OUTPATIENT
Start: 2024-05-07 | End: 2024-05-07

## 2024-05-07 RX ORDER — HYDROMORPHONE HYDROCHLORIDE 1 MG/ML
0.6 INJECTION, SOLUTION INTRAMUSCULAR; INTRAVENOUS; SUBCUTANEOUS EVERY 5 MIN PRN
OUTPATIENT
Start: 2024-05-07

## 2024-05-07 RX ORDER — MORPHINE SULFATE 10 MG/ML
6 INJECTION, SOLUTION INTRAMUSCULAR; INTRAVENOUS EVERY 10 MIN PRN
OUTPATIENT
Start: 2024-05-07

## 2024-05-07 RX ORDER — ACETAMINOPHEN 500 MG
1000 TABLET ORAL ONCE
Status: COMPLETED | OUTPATIENT
Start: 2024-05-07 | End: 2024-05-07

## 2024-05-07 RX ORDER — HYDROMORPHONE HYDROCHLORIDE 1 MG/ML
0.4 INJECTION, SOLUTION INTRAMUSCULAR; INTRAVENOUS; SUBCUTANEOUS EVERY 5 MIN PRN
OUTPATIENT
Start: 2024-05-07

## 2024-05-07 RX ORDER — MORPHINE SULFATE 2 MG/ML
2 INJECTION, SOLUTION INTRAMUSCULAR; INTRAVENOUS EVERY 10 MIN PRN
OUTPATIENT
Start: 2024-05-07

## 2024-05-07 RX ORDER — HYDROMORPHONE HYDROCHLORIDE 1 MG/ML
0.2 INJECTION, SOLUTION INTRAMUSCULAR; INTRAVENOUS; SUBCUTANEOUS EVERY 5 MIN PRN
OUTPATIENT
Start: 2024-05-07

## 2024-05-07 RX ORDER — NALOXONE HYDROCHLORIDE 0.4 MG/ML
0.08 INJECTION, SOLUTION INTRAMUSCULAR; INTRAVENOUS; SUBCUTANEOUS AS NEEDED
OUTPATIENT
Start: 2024-05-07 | End: 2024-05-07

## 2024-05-07 RX ORDER — MORPHINE SULFATE 4 MG/ML
4 INJECTION, SOLUTION INTRAMUSCULAR; INTRAVENOUS EVERY 10 MIN PRN
OUTPATIENT
Start: 2024-05-07

## 2024-05-07 RX ORDER — SODIUM CHLORIDE, SODIUM LACTATE, POTASSIUM CHLORIDE, CALCIUM CHLORIDE 600; 310; 30; 20 MG/100ML; MG/100ML; MG/100ML; MG/100ML
INJECTION, SOLUTION INTRAVENOUS CONTINUOUS
OUTPATIENT
Start: 2024-05-07

## 2024-05-07 NOTE — PROGRESS NOTES
Chyna Frost is a 49 year old female  Patient's last menstrual period was 07/10/2023. No chief complaint on file.  Pt here for left bartholins cystectomy, but states that it spontaneously ruptured 5 days ago    OBSTETRICS HISTORY:     OB History    Para Term  AB Living   3 2 2   1     SAB IAB Ectopic Multiple Live Births   1              # Outcome Date GA Lbr Santhosh/2nd Weight Sex Type Anes PTL Lv   3 Term      CS-LTranv      2 SAB 2010           1 Term      Vag-Spont          GYNE HISTORY:         Menarche: 13 (4/3/2024 10:03 AM)  Period Cycle (Days): Hysterectomy (4/3/2024 10:03 AM)  Period Duration (Days): 7 (2023 11:10 AM)  Period Flow: painful and heavy (2023 11:10 AM)  Use of Birth Control (if yes, specify type): Hysterectomy (4/3/2024 10:03 AM)  Hx Prior Abnormal Pap: No (4/3/2024 10:03 AM)  Pap Result Notes:  normal per pt (4/3/2024 10:03 AM)        Latest Ref Rng & Units 4/3/2024    10:25 AM   RECENT PAP RESULTS   Thinprep Pap Negative for intraepithelial lesion or malignancy Negative for intraepithelial lesion or malignancy    HPV Negative Negative          MEDICAL HISTORY:     Past Medical History:    History of blood transfusion    age 3. No reaction       SURGICAL HISTORY:     Past Surgical History:   Procedure Laterality Date     delivery only      Hysterectomy  2023    Robotic total laparoscopic hysterectomy with bilateral salpingectomy    Tonsillectomy         SOCIAL HISTORY:     Social History     Socioeconomic History    Marital status: Single   Tobacco Use    Smoking status: Never    Smokeless tobacco: Never   Vaping Use    Vaping status: Never Used   Substance and Sexual Activity    Alcohol use: Never    Drug use: Never     Social Determinants of Health     Financial Resource Strain: Not on File (10/6/2022)    [facility] from [facility]     Financial Resource Strain     Financial Resource Strain: 0   Food Insecurity: Not on  File (10/6/2022)    Received from [facility]     Food Insecurity     Food: 0   Transportation Needs: Not on File (10/6/2022)    Received from [facility]     Transportation Needs     Transportation: 0   Physical Activity: Not on File (10/6/2022)    Received from [facility]     Physical Activity     Physical Activity: 0   Stress: Not on File (10/6/2022)    Received from [facility]     Stress     Stress: 0   Social Connections: Not on File (10/6/2022)    Received from [facility]     Social Connections     Social Connections and Isolation: 0   Housing Stability: Not on File (10/6/2022)    Received from [facility]     Housing Stability     Housin        FAMILY HISTORY:     Family History   Problem Relation Age of Onset    No Known Problems Father     Diabetes Mother        MEDICATIONS:     No current outpatient medications on file.    ALLERGIES:     No Known Allergies      REVIEW OF SYSTEMS:     Constitutional:    denies fever / chills  Eyes:     denies blurred or double vision  Cardiovascular:  denies chest pain or palpitations  Respiratory:    denies shortness of breath  Gastrointestinal:  denies severe abdominal pain, frequent diarrhea or constipation, nausea / vomiting  Genitourinary:    denies dysuria, bothersome incontinence  Skin/Breast:   denies any breast pain, lumps, or discharge  Neurological:    denies frequent severe headaches  Psychiatric:   denies depression or anxiety, thoughts of harming self or others  Heme/Lymph:    denies easy bruising or bleeding      PHYSICAL EXAM:   Blood pressure 111/90, pulse 63, temperature 97.8 °F (36.6 °C), temperature source Oral, resp. rate 18, height 5' 1\" (1.549 m), weight 146 lb 11.2 oz (66.5 kg), last menstrual period 07/10/2023, SpO2 100%.  Constitutional:  well developed, well nourished  Head/Face:  normocephalic  Neck/Thyroid: thyroid symmetric, no thyromegaly, no nodules, no adenopathy  Lymphatic: no abnormal supraclavicular or axillary  adenopathy is noted  Respiratory:      chest wall symmetric and nontender on palpation, clear to asculation bilateral, no wheezing, rales, ronchi, and resonance normal upon percussion  Cardiovascular: chest normal in appearance, regular rate and rhythm, no murmurs, PMI palpated midclavicular line  Abdomen:   soft, nontender, nondistended, no masses  Skin/Hair:  no unusual rashes or bruises  Extremities:  no edema, no cyanosis, non tender bilaterally  Psychiatric:   oriented to time, place, person and situation. Appropriate mood and affect    Pelvic Exam:  External Genitalia:  normal appearance, hair distribution, and no lesions  Urethral Meatus:   normal in size, location, without lesions   Perineum inspected and no bartholins cyst. The previous bartholins had resolved completely on exam, no erythema, no infection    ASSESSMENT & PLAN:   Left bartholins cyst- resolved  Plan: cancel surgery.       FOLLOW-UP     No follow-ups on file.      Yannick Tobar MD  5/7/2024

## 2024-05-07 NOTE — ANESTHESIA PREPROCEDURE EVALUATION
Anesthesia PreOp Note    HPI:     Chyna Frost is a 49 year old female who presents for preoperative consultation requested by: Yannick Tobar MD    Date of Surgery: 2024    Procedure(s):  Left bartholins marsupialization  Indication: Cyst of left Bartholin's gland [N75.0]    Relevant Problems   No relevant active problems       NPO:                         History Review:  Patient Active Problem List    Diagnosis Date Noted    Menorrhagia, premenopausal 2023    Fibroids, intramural 2023       Past Medical History:    History of blood transfusion    age 3. No reaction       Past Surgical History:   Procedure Laterality Date     delivery only      Hysterectomy  2023    Robotic total laparoscopic hysterectomy with bilateral salpingectomy    Tonsillectomy         Medications Prior to Admission   Medication Sig Dispense Refill Last Dose    Multiple Vitamin (MULTIVITAMIN ADULT OR) Take 1 tablet by mouth daily.   5/3/2024     Current Facility-Administered Medications Ordered in Epic   Medication Dose Route Frequency Provider Last Rate Last Admin    lactated ringers infusion   Intravenous Continuous Yannick Tobar MD        acetaminophen (Tylenol Extra Strength) tab 1,000 mg  1,000 mg Oral Once Yannick Tobar MD         No current [facility]-ordered outpatient medications on file.       No Known Allergies    Family History   Problem Relation Age of Onset    No Known Problems Father     Diabetes Mother      Social History     Socioeconomic History    Marital status: Single   Tobacco Use    Smoking status: Never    Smokeless tobacco: Never   Vaping Use    Vaping status: Never Used   Substance and Sexual Activity    Alcohol use: Never    Drug use: Never       Available pre-op labs reviewed.             Vital Signs:  Body mass index is 28.91 kg/m².   height is 1.549 m (5' 1\") and weight is 69.4 kg (153 lb).   Vitals:    24 0948   Weight: 69.4 kg (153 lb)   Height: 1.549 m  (5' 1\")        Anesthesia Evaluation     Patient summary reviewed and Nursing notes reviewed    No history of anesthetic complications   Airway   Mallampati: III  TM distance: >3 FB  Neck ROM: full  Dental - Dentition appears grossly intact     Pulmonary - negative ROS and normal exam   Cardiovascular - negative ROS and normal exam    Neuro/Psych - negative ROS     GI/Hepatic/Renal - negative ROS     Endo/Other      Comments: fibroids  Abdominal                  Anesthesia Plan:   ASA:  2  Plan:   General  Airway:  LMA  Post-op Pain Management: IV analgesics  Plan Comments: I have discussed the anesthetic plan, major risks and alternatives with the patient and answered all questions. The patient desires to proceed with surgery and anesthesia as planned.     Informed Consent Plan and Risks Discussed With:  Patient  Discussed plan with:  CRNA      I have informed Chyna Frost and/or legal guardian or family member of the nature of the anesthetic plan, benefits, risks including possible dental damage if relevant, major complications, and any alternative forms of anesthetic management.   All of the patient's questions were answered to the best of my ability. The patient desires the anesthetic management as planned.  Pa Mcpherson MD  5/7/2024 9:18 AM  Present on Admission:  **None**

## 2024-05-08 NOTE — TELEPHONE ENCOUNTER
"Chief Complaint   Patient presents with    Recheck Medication     Thyroid med and labs. Pt will go online to complete ACT questions.     initial LMP 05/04/2024 (Exact Date)  Estimated body mass index is 46.43 kg/m  as calculated from the following:    Height as of 8/3/23: 1.651 m (5' 5\").    Weight as of 9/8/23: 126.6 kg (279 lb)..  bp completed using cuff size NA (Not Taken)  ANA ABREU LPN  " Please schedule the following surgery:    Procedure: left bartholins marsupialization  Assist: (Y/N or none) donchev  Date:  a day when I am on call  Dx:left bartholins cyst  Pre-op appt: (Y/N or n/a) n/a  Admission type: (IN/OUT/OBVS) out  Department of discharge(SDS/Floor):   Expected length of stay:   Procedure length time (please enter amount you are requesting): 30 mins  Recovery time (patients always ask): 2 days  Medical Clearance: (Y/N) no  Special Requests:     Surgical prophylaxis:        ALL Medicaid/including BCBS community: Tubal/ Hyst form MUST be signed (30 days):     Message to nurses:

## 2024-08-16 ENCOUNTER — HOSPITAL ENCOUNTER (OUTPATIENT)
Dept: MAMMOGRAPHY | Facility: HOSPITAL | Age: 49
Discharge: HOME | End: 2024-08-16
Attending: OBSTETRICS & GYNECOLOGY
Payer: MEDICAID

## 2024-08-16 DIAGNOSIS — Z12.31 BREAST CANCER SCREENING BY MAMMOGRAM: ICD-10-CM

## 2024-08-16 PROCEDURE — 77067 SCR MAMMO BI INCL CAD: CPT | Performed by: OBSTETRICS & GYNECOLOGY

## 2024-08-16 PROCEDURE — 77063 BREAST TOMOSYNTHESIS BI: CPT | Performed by: OBSTETRICS & GYNECOLOGY

## 2024-09-25 ENCOUNTER — OFFICE VISIT (OUTPATIENT)
Dept: OBGYN CLINIC | Facility: CLINIC | Age: 49
End: 2024-09-25

## 2024-09-25 VITALS
BODY MASS INDEX: 26.81 KG/M2 | DIASTOLIC BLOOD PRESSURE: 70 MMHG | HEIGHT: 61 IN | SYSTOLIC BLOOD PRESSURE: 108 MMHG | HEART RATE: 94 BPM | WEIGHT: 142 LBS

## 2024-09-25 DIAGNOSIS — N93.0 POSTCOITAL BLEEDING: Primary | ICD-10-CM

## 2024-09-25 PROCEDURE — 99213 OFFICE O/P EST LOW 20 MIN: CPT | Performed by: OBSTETRICS & GYNECOLOGY

## 2024-09-25 NOTE — PROGRESS NOTES
Chyna Frost is a 49 year old female  Patient's last menstrual period was 07/10/2023.   Chief Complaint   Patient presents with    Gyn Exam     Pt c/o spotting after intercourse. Vaginal discharge   C/o post coital spotting    OBSTETRICS HISTORY:     OB History    Para Term  AB Living   3 2 2   1     SAB IAB Ectopic Multiple Live Births   1              # Outcome Date GA Lbr Santhosh/2nd Weight Sex Type Anes PTL Lv   3 Term      CS-LTranv      2 SAB 2010           1 Term      Vag-Spont          GYNE HISTORY:         Menarche: 13 (4/3/2024 10:03 AM)  Period Cycle (Days): Hysterectomy (4/3/2024 10:03 AM)  Use of Birth Control (if yes, specify type): Hysterectomy (4/3/2024 10:03 AM)  Hx Prior Abnormal Pap: No (4/3/2024 10:03 AM)  Pap Result Notes:  normal per pt (4/3/2024 10:03 AM)        Latest Ref Rng & Units 4/3/2024    10:25 AM   RECENT PAP RESULTS   INTERPRETATION/RESULT: Negative for intraepithelial lesion or malignancy Negative for intraepithelial lesion or malignancy    HPV Negative Negative          MEDICAL HISTORY:     Past Medical History:    History of blood transfusion    age 3. No reaction       SURGICAL HISTORY:     Past Surgical History:   Procedure Laterality Date     delivery only      Hysterectomy  2023    Robotic total laparoscopic hysterectomy with bilateral salpingectomy    Tonsillectomy         SOCIAL HISTORY:     Social History     Socioeconomic History    Marital status: Single   Tobacco Use    Smoking status: Never    Smokeless tobacco: Never   Vaping Use    Vaping status: Never Used   Substance and Sexual Activity    Alcohol use: Never    Drug use: Never     Social Determinants of Health     Financial Resource Strain: Not on File (10/6/2022)    [facility] from [facility]    Financial Resource Strain     Financial Resource Strain: 0   Transportation Needs: Not on File (10/6/2022)    Received from [facility]    Transportation Needs      Transportation: 0   Physical Activity: Not on File (10/6/2022)    [facility] from [facility]    Physical Activity     Physical Activity: 0   Stress: Not on File (10/6/2022)    Received from [facility]    Stress     Stress: 0   Social Connections: Not on File (2024)    Received from [facility]    Social Connections     Connectedness: 0   Housing Stability: Not on File (10/6/2022)    Received from [facility]    Housing Stability     Housin        FAMILY HISTORY:     Family History   Problem Relation Age of Onset    No Known Problems Father     Diabetes Mother        MEDICATIONS:       Current Outpatient Medications:     Multiple Vitamin (MULTIVITAMIN ADULT OR), Take 1 tablet by mouth daily., Disp: , Rfl:     ALLERGIES:     No Known Allergies      REVIEW OF SYSTEMS:     Constitutional:    denies fever / chills  Eyes:     denies blurred or double vision  Cardiovascular:  denies chest pain or palpitations  Respiratory:    denies shortness of breath  Gastrointestinal:  denies severe abdominal pain, frequent diarrhea or constipation, nausea / vomiting  Genitourinary:    denies dysuria, bothersome incontinence  Skin/Breast:   denies any breast pain, lumps, or discharge  Neurological:    denies frequent severe headaches  Psychiatric:   denies depression or anxiety, thoughts of harming self or others  Heme/Lymph:    denies easy bruising or bleeding      PHYSICAL EXAM:   Blood pressure 108/70, pulse 94, height 5' 1\" (1.549 m), weight 142 lb (64.4 kg), last menstrual period 07/10/2023, not currently breastfeeding.  Constitutional:  well developed, well nourished  Head/Face:  normocephalic  Neck/Thyroid: thyroid symmetric, no thyromegaly, no nodules, no adenopathy    Pelvic Exam:  External Genitalia:  normal appearance, hair distribution, and no lesions  Urethral Meatus:   normal in size, location, without lesions   Bladder:    no fullness, masses or tenderness  Vagina:    normal appearance without lesions, no abnormal  discharge  Small piece granulation tissue mid cuff seen, and coagulated with one silver nitrate stick.       ASSESSMENT & PLAN:     Chyna was seen today for gyn exam.    Diagnoses and all orders for this visit:    Postcoital bleeding    Granlation tissue coagulated. Rtc 2 wks for repeat exam speculum      FOLLOW-UP     Return in about 2 weeks (around 10/9/2024) for postop visit.      Yannick Tobar MD  9/25/2024

## 2024-10-08 ENCOUNTER — OFFICE VISIT (OUTPATIENT)
Dept: OBGYN CLINIC | Facility: CLINIC | Age: 49
End: 2024-10-08
Payer: MEDICAID

## 2024-10-08 VITALS
HEART RATE: 84 BPM | SYSTOLIC BLOOD PRESSURE: 135 MMHG | BODY MASS INDEX: 26.81 KG/M2 | DIASTOLIC BLOOD PRESSURE: 86 MMHG | HEIGHT: 61 IN | WEIGHT: 142 LBS

## 2024-10-08 DIAGNOSIS — Z09 POSTOP CHECK: Primary | ICD-10-CM

## 2024-10-08 NOTE — PROGRESS NOTES
Chyna Frost is a 49 year old female  Patient's last menstrual period was 07/10/2023.   Chief Complaint   Patient presents with    Post-Op       OBSTETRICS HISTORY:     OB History    Para Term  AB Living   3 2 2   1     SAB IAB Ectopic Multiple Live Births   1              # Outcome Date GA Lbr Santhosh/2nd Weight Sex Type Anes PTL Lv   3 Term      CS-LTranv      2 SAB 2010           1 Term      Vag-Spont          GYNE HISTORY:         Menarche: 13 (4/3/2024 10:03 AM)  Period Cycle (Days): Hysterectomy (4/3/2024 10:03 AM)  Use of Birth Control (if yes, specify type): Hysterectomy (4/3/2024 10:03 AM)  Hx Prior Abnormal Pap: No (4/3/2024 10:03 AM)  Pap Result Notes:  normal per pt (4/3/2024 10:03 AM)        Latest Ref Rng & Units 4/3/2024    10:25 AM   RECENT PAP RESULTS   INTERPRETATION/RESULT: Negative for intraepithelial lesion or malignancy Negative for intraepithelial lesion or malignancy    HPV Negative Negative          MEDICAL HISTORY:     Past Medical History:    History of blood transfusion    age 3. No reaction       SURGICAL HISTORY:     Past Surgical History:   Procedure Laterality Date     delivery only      Hysterectomy  2023    Robotic total laparoscopic hysterectomy with bilateral salpingectomy    Tonsillectomy         SOCIAL HISTORY:     Social History     Socioeconomic History    Marital status: Single   Tobacco Use    Smoking status: Never    Smokeless tobacco: Never   Vaping Use    Vaping status: Never Used   Substance and Sexual Activity    Alcohol use: Never    Drug use: Never     Social Determinants of Health     Financial Resource Strain: Not on File (10/6/2022)    Received from [facility]    Financial Resource Strain     Financial Resource Strain: 0   Food Insecurity: Not on File (2024)    Received from [facility]    Food Insecurity     Food: 0   Transportation Needs: Not on File (10/6/2022)    Received from [facility]     Transportation Needs     Transportation: 0   Physical Activity: Not on File (10/6/2022)    [facility] from [facility]    Physical Activity     Physical Activity: 0   Stress: Not on File (10/6/2022)    Received from [facility]    Stress     Stress: 0   Social Connections: Not on File (2024)    Received from [facility]    Social Connections     Connectedness: 0   Housing Stability: Not on File (10/6/2022)    Received from [facility]    Housing Stability     Housin        FAMILY HISTORY:     Family History   Problem Relation Age of Onset    No Known Problems Father     Diabetes Mother        MEDICATIONS:       Current Outpatient Medications:     Multiple Vitamin (MULTIVITAMIN ADULT OR), Take 1 tablet by mouth daily., Disp: , Rfl:     ALLERGIES:     No Known Allergies      REVIEW OF SYSTEMS:     Constitutional:    denies fever / chills  Eyes:     denies blurred or double vision  Cardiovascular:  denies chest pain or palpitations  Respiratory:    denies shortness of breath  Gastrointestinal:  denies severe abdominal pain, frequent diarrhea or constipation, nausea / vomiting  Genitourinary:    denies dysuria, bothersome incontinence  Skin/Breast:   denies any breast pain, lumps, or discharge  Neurological:    denies frequent severe headaches  Psychiatric:   denies depression or anxiety, thoughts of harming self or others  Heme/Lymph:    denies easy bruising or bleeding      PHYSICAL EXAM:   Blood pressure 135/86, pulse 84, height 5' 1\" (1.549 m), weight 142 lb (64.4 kg), last menstrual period 07/10/2023, not currently breastfeeding.  Constitutional:  well developed, well nourished  Head/Face:  normocephalic  Neck/Thyroid: thyroid symmetric, no thyromegaly, no nodules, no adenopathy  Lymphatic: no abnormal supraclavicular or axillary adenopathy is noted  Respiratory:      chest wall symmetric and nontender on palpation, clear to asculation bilateral, no wheezing, rales, ronchi, and resonance normal upon  percussion  Cardiovascular: chest normal in appearance, regular rate and rhythm, no murmurs, PMI palpated midclavicular line  Breast:   normal bilaterally without palpable masses, tenderness, asymmetry, nipple discharge, nipple retraction or skin changes bilaterally  Abdomen:   soft, nontender, nondistended, no masses  Skin/Hair:  no unusual rashes or bruises  Extremities:  no edema, no cyanosis, non tender bilaterally  Psychiatric:   oriented to time, place, person and situation. Appropriate mood and affect    Pelvic Exam:  External Genitalia:  normal appearance, hair distribution, and no lesions  Urethral Meatus:   normal in size, location, without lesions   Bladder:    no fullness, masses or tenderness  Vagina:    normal appearance without lesions, no abnormal discharge  Cervix:    No lesions, normal friability   Uterus:    normal in size, 8 wk sized, normal contour, position, mobility, without  motion tenderness  Adnexa:   normal without masses or tenderness  Perineum:   normal  Anus: no hemorroids     Mid cuff granulation tissue seen, and coagulated with silver nitrate, pulling the base down, almost resected at cuff.     ASSESSMENT & PLAN:     Chyna was seen today for post-op.    Diagnoses and all orders for this visit:    Postop check      Rtc 2 wks    FOLLOW-UP     Return in about 2 weeks (around 10/22/2024) for gyn prblm.      Yannick Tobar MD  10/8/2024

## 2024-10-22 ENCOUNTER — OFFICE VISIT (OUTPATIENT)
Dept: OBGYN CLINIC | Facility: CLINIC | Age: 49
End: 2024-10-22
Payer: MEDICAID

## 2024-10-22 VITALS
WEIGHT: 140 LBS | HEART RATE: 78 BPM | HEIGHT: 61 IN | DIASTOLIC BLOOD PRESSURE: 77 MMHG | BODY MASS INDEX: 26.43 KG/M2 | SYSTOLIC BLOOD PRESSURE: 113 MMHG

## 2024-10-22 DIAGNOSIS — Z09 POSTOP CHECK: Primary | ICD-10-CM

## 2024-10-22 NOTE — PROGRESS NOTES
Chyna Frost is a 49 year old female  Patient's last menstrual period was 07/10/2023.   Chief Complaint   Patient presents with    Gyn Exam     Pt here for follow up on incision make sure its healing properly       OBSTETRICS HISTORY:     OB History    Para Term  AB Living   3 2 2   1     SAB IAB Ectopic Multiple Live Births   1              # Outcome Date GA Lbr Santhosh/2nd Weight Sex Type Anes PTL Lv   3 Term      CS-LTranv      2 SAB 2010           1 Term      Vag-Spont          GYNE HISTORY:         Menarche: 13 (4/3/2024 10:03 AM)  Period Cycle (Days): Hysterectomy (4/3/2024 10:03 AM)  Use of Birth Control (if yes, specify type): Hysterectomy (4/3/2024 10:03 AM)  Hx Prior Abnormal Pap: No (4/3/2024 10:03 AM)  Pap Result Notes:  normal per pt (4/3/2024 10:03 AM)        Latest Ref Rng & Units 4/3/2024    10:25 AM   RECENT PAP RESULTS   INTERPRETATION/RESULT: Negative for intraepithelial lesion or malignancy Negative for intraepithelial lesion or malignancy    HPV Negative Negative          MEDICAL HISTORY:     Past Medical History:    History of blood transfusion    age 3. No reaction       SURGICAL HISTORY:     Past Surgical History:   Procedure Laterality Date     delivery only      Hysterectomy  2023    Robotic total laparoscopic hysterectomy with bilateral salpingectomy    Tonsillectomy         SOCIAL HISTORY:     Social History     Socioeconomic History    Marital status: Single   Tobacco Use    Smoking status: Never     Passive exposure: Never    Smokeless tobacco: Never   Vaping Use    Vaping status: Never Used   Substance and Sexual Activity    Alcohol use: Never    Drug use: Never     Social Drivers of Health     Financial Resource Strain: Not on File (10/6/2022)    Received from [facility]    Financial Resource Strain     Financial Resource Strain: 0   Food Insecurity: Not on File (2024)    Received from [facility]    Food Insecurity      Food: 0   Transportation Needs: Not on File (10/6/2022)    Received from [facility]    Transportation Needs     Transportation: 0   Physical Activity: Not on File (10/6/2022)    Received from [facility]    Physical Activity     Physical Activity: 0   Stress: Not on File (10/6/2022)    Received from [facility]    Stress     Stress: 0   Social Connections: Not on File (2024)    Received from [facility]    Social Connections     Connectedness: 0   Housing Stability: Not on File (10/6/2022)    Received from [facility]    Housing Stability     Housin        FAMILY HISTORY:     Family History   Problem Relation Age of Onset    No Known Problems Father     Diabetes Mother        MEDICATIONS:       Current Outpatient Medications:     Multiple Vitamin (MULTIVITAMIN ADULT OR), Take 1 tablet by mouth daily., Disp: , Rfl:     ALLERGIES:     Allergies[1]      REVIEW OF SYSTEMS:     Constitutional:    denies fever / chills  Eyes:     denies blurred or double vision  Cardiovascular:  denies chest pain or palpitations  Respiratory:    denies shortness of breath  Gastrointestinal:  denies severe abdominal pain, frequent diarrhea or constipation, nausea / vomiting  Genitourinary:    denies dysuria, bothersome incontinence  Skin/Breast:   denies any breast pain, lumps, or discharge  Neurological:    denies frequent severe headaches  Psychiatric:   denies depression or anxiety, thoughts of harming self or others  Heme/Lymph:    denies easy bruising or bleeding      PHYSICAL EXAM:   Blood pressure 113/77, pulse 78, height 5' 1\" (1.549 m), weight 140 lb (63.5 kg), last menstrual period 07/10/2023, not currently breastfeeding.  Constitutional:  well developed, well nourished  Head/Face:  normocephalic  Neck/Thyroid: thyroid symmetric, no thyromegaly, no nodules, no adenopathy  Lymphatic: no abnormal supraclavicular or axillary adenopathy is noted  Respiratory:      chest wall symmetric and nontender on palpation, clear to  asculation bilateral, no wheezing, rales, ronchi, and resonance normal upon percussion  Cardiovascular: chest normal in appearance, regular rate and rhythm, no murmurs, PMI palpated midclavicular line  Breast:   normal bilaterally without palpable masses, tenderness, asymmetry, nipple discharge, nipple retraction or skin changes bilaterally  Abdomen:   soft, nontender, nondistended, no masses  Skin/Hair:  no unusual rashes or bruises  Extremities:  no edema, no cyanosis, non tender bilaterally  Psychiatric:   oriented to time, place, person and situation. Appropriate mood and affect    Pelvic Exam:  External Genitalia:  normal appearance, hair distribution, and no lesions  Urethral Meatus:   normal in size, location, without lesions   Bladder:    no fullness, masses or tenderness  Vagina:    normal appearance without lesions, no abnormal discharge  Cervix:    No lesions, normal friability   Uterus:    normal in size, 8 wk sized, normal contour, position, mobility, without  motion tenderness  Adnexa:   normal without masses or tenderness  Perineum:   normal  Anus: no hemorroids   Sse: 2mm gran tissue mid cuff excised with scissors, and silver nitrate applied to oozing area.       ASSESSMENT & PLAN:     Chyna was seen today for gyn exam.    Diagnoses and all orders for this visit:    Postop check    Normal check      FOLLOW-UP     Return in about 2 weeks (around 11/5/2024) for postop visit.      Yannick Tobar MD  10/22/2024       [1] No Known Allergies

## 2024-10-23 ENCOUNTER — TELEPHONE (OUTPATIENT)
Dept: OBGYN CLINIC | Facility: CLINIC | Age: 49
End: 2024-10-23

## 2024-10-23 NOTE — TELEPHONE ENCOUNTER
#[id number]    Pt name and  verified. Pt informed of Dr. Tobar's recommendation. Pt has no further questions at this time.

## 2024-11-05 ENCOUNTER — OFFICE VISIT (OUTPATIENT)
Dept: OBGYN CLINIC | Facility: CLINIC | Age: 49
End: 2024-11-05
Payer: MEDICAID

## 2024-11-05 VITALS
BODY MASS INDEX: 26 KG/M2 | DIASTOLIC BLOOD PRESSURE: 75 MMHG | SYSTOLIC BLOOD PRESSURE: 115 MMHG | WEIGHT: 140 LBS | HEART RATE: 98 BPM

## 2024-11-05 DIAGNOSIS — Z09 POSTOP CHECK: Primary | ICD-10-CM

## 2024-11-05 NOTE — PROGRESS NOTES
Chyna Frost is a 49 year old female  Patient's last menstrual period was 07/10/2023. No chief complaint on file.      OBSTETRICS HISTORY:     OB History    Para Term  AB Living   3 2 2   1     SAB IAB Ectopic Multiple Live Births   1              # Outcome Date GA Lbr Santhosh/2nd Weight Sex Type Anes PTL Lv   3 Term      CS-LTranv      2 SAB 2010           1 Term      Vag-Spont          GYNE HISTORY:         Menarche: 13 (4/3/2024 10:03 AM)  Period Cycle (Days): Hysterectomy (4/3/2024 10:03 AM)  Use of Birth Control (if yes, specify type): Hysterectomy (4/3/2024 10:03 AM)  Hx Prior Abnormal Pap: No (4/3/2024 10:03 AM)  Pap Result Notes:  normal per pt (4/3/2024 10:03 AM)        Latest Ref Rng & Units 4/3/2024    10:25 AM   RECENT PAP RESULTS   INTERPRETATION/RESULT: Negative for intraepithelial lesion or malignancy Negative for intraepithelial lesion or malignancy    HPV Negative Negative          MEDICAL HISTORY:     Past Medical History:    History of blood transfusion    age 3. No reaction       SURGICAL HISTORY:     Past Surgical History:   Procedure Laterality Date     delivery only      Hysterectomy  2023    Robotic total laparoscopic hysterectomy with bilateral salpingectomy    Tonsillectomy         SOCIAL HISTORY:     Social History     Socioeconomic History    Marital status: Single   Tobacco Use    Smoking status: Never     Passive exposure: Never    Smokeless tobacco: Never   Vaping Use    Vaping status: Never Used   Substance and Sexual Activity    Alcohol use: Never    Drug use: Never     Social Drivers of Health     Financial Resource Strain: Not on File (10/6/2022)    Received from [facility]    Financial Resource Strain     Financial Resource Strain: 0   Food Insecurity: Not on File (2024)    Received from [facility]    Food Insecurity     Food: 0   Transportation Needs: Not on File (10/6/2022)    Received from [facility]     Transportation Needs     Transportation: 0   Physical Activity: Not on File (10/6/2022)    Received from [facility]    Physical Activity     Physical Activity: 0   Stress: Not on File (10/6/2022)    Received from [facility]    Stress     Stress: 0   Social Connections: Not on File (2024)    Received from [facility]    Social Connections     Connectedness: 0   Housing Stability: Not on File (10/6/2022)    Received from [facility]    Housing Stability     Housin        FAMILY HISTORY:     Family History   Problem Relation Age of Onset    No Known Problems Father     Diabetes Mother        MEDICATIONS:       Current Outpatient Medications:     Multiple Vitamin (MULTIVITAMIN ADULT OR), Take 1 tablet by mouth daily., Disp: , Rfl:     ALLERGIES:     Allergies[1]      REVIEW OF SYSTEMS:     Constitutional:    denies fever / chills  Eyes:     denies blurred or double vision  Cardiovascular:  denies chest pain or palpitations  Respiratory:    denies shortness of breath  Gastrointestinal:  denies severe abdominal pain, frequent diarrhea or constipation, nausea / vomiting  Genitourinary:    denies dysuria, bothersome incontinence  Skin/Breast:   denies any breast pain, lumps, or discharge  Neurological:    denies frequent severe headaches  Psychiatric:   denies depression or anxiety, thoughts of harming self or others  Heme/Lymph:    denies easy bruising or bleeding      PHYSICAL EXAM:   Blood pressure 115/75, pulse 98, weight 140 lb (63.5 kg), last menstrual period 07/10/2023, not currently breastfeeding.  Constitutional:  well developed, well nourished  Head/Face:  normocephalic  Neck/Thyroid: thyroid symmetric, no thyromegaly, no nodules, no adenopathy  Lymphatic: no abnormal supraclavicular or axillary adenopathy is noted  Respiratory:      chest wall symmetric and nontender on palpation, clear to asculation bilateral, no wheezing, rales, ronchi, and resonance normal upon percussion  Cardiovascular: chest normal  in appearance, regular rate and rhythm, no murmurs, PMI palpated midclavicular line  Breast:   normal bilaterally without palpable masses, tenderness, asymmetry, nipple discharge, nipple retraction or skin changes bilaterally  Abdomen:   soft, nontender, nondistended, no masses  Skin/Hair:  no unusual rashes or bruises  Extremities:  no edema, no cyanosis, non tender bilaterally  Psychiatric:   oriented to time, place, person and situation. Appropriate mood and affect    Pelvic Exam:  External Genitalia:  normal appearance, hair distribution, and no lesions  Urethral Meatus:   normal in size, location, without lesions   Bladder:    no fullness, masses or tenderness  Vagina:    normal appearance without lesions, no abnormal discharge  No cuff granulation tissue. healed  ASSESSMENT & PLAN:     Diagnoses and all orders for this visit:    Postop check    Normal postop check    FOLLOW-UP     Return in about 1 year (around 11/5/2025) for Annual exam.      Yannick Tobar MD  11/5/2024       [1] No Known Allergies

## 2024-11-08 ENCOUNTER — TELEPHONE (OUTPATIENT)
Dept: OBGYN CLINIC | Facility: CLINIC | Age: 49
End: 2024-11-08

## 2024-11-08 NOTE — TELEPHONE ENCOUNTER
Pt name and  verified     Pt seen  with Dr. Tobar. Pt requesting to have hormones checked to see if she is approaching menopause. Pt informed request will be routed to Dr. Tobar to approve of order. Pt informed we will reach back out once Dr. Tobar accepts or denies the request. Pt verbalized understanding.

## 2024-11-11 DIAGNOSIS — N91.2 AMENORRHEA: Primary | ICD-10-CM

## 2024-11-11 NOTE — TELEPHONE ENCOUNTER
Pt name and  verified     Pt informed labs have been ordered and pt can get labs completed. Pt verbalized understanding and agreed.

## 2025-01-15 ENCOUNTER — TELEPHONE (OUTPATIENT)
Dept: OBGYN CLINIC | Facility: CLINIC | Age: 50
End: 2025-01-15

## 2025-01-15 NOTE — TELEPHONE ENCOUNTER
Patient states she was in a few months ago and had mentioned wanting to get hormones checked patient wondering if she needs an appointment to discuss again with Dr. Tobar. Please advise

## 2025-01-15 NOTE — TELEPHONE ENCOUNTER
Pt asked regarding pending hormone lab orders. Pt informed they status orders where active and could go get them done at one of our [facility]. Pt verbalized understanding no further question.

## 2025-01-20 ENCOUNTER — LAB ENCOUNTER (OUTPATIENT)
Dept: LAB | Facility: REFERENCE LAB | Age: 50
End: 2025-01-20
Attending: OBSTETRICS & GYNECOLOGY
Payer: MEDICAID

## 2025-01-20 DIAGNOSIS — N91.2 AMENORRHEA: ICD-10-CM

## 2025-01-20 LAB
FSH SERPL-ACNC: 2.4 MIU/ML
LH SERPL-ACNC: 1.3 MIU/ML
TSI SER-ACNC: 2.42 UIU/ML (ref 0.55–4.78)

## 2025-01-20 PROCEDURE — 83002 ASSAY OF GONADOTROPIN (LH): CPT

## 2025-01-20 PROCEDURE — 84443 ASSAY THYROID STIM HORMONE: CPT

## 2025-01-20 PROCEDURE — 83001 ASSAY OF GONADOTROPIN (FSH): CPT

## 2025-01-20 PROCEDURE — 36415 COLL VENOUS BLD VENIPUNCTURE: CPT

## 2025-01-27 ENCOUNTER — OFFICE VISIT (OUTPATIENT)
Dept: OBGYN CLINIC | Facility: CLINIC | Age: 50
End: 2025-01-27
Payer: MEDICAID

## 2025-01-27 VITALS
SYSTOLIC BLOOD PRESSURE: 128 MMHG | HEART RATE: 84 BPM | DIASTOLIC BLOOD PRESSURE: 86 MMHG | BODY MASS INDEX: 27.19 KG/M2 | WEIGHT: 144 LBS | HEIGHT: 61 IN

## 2025-01-27 DIAGNOSIS — B37.31 VAGINAL CANDIDIASIS: Primary | ICD-10-CM

## 2025-01-27 PROCEDURE — 99212 OFFICE O/P EST SF 10 MIN: CPT | Performed by: OBSTETRICS & GYNECOLOGY

## 2025-01-27 RX ORDER — FLUTICASONE PROPIONATE 50 MCG
1 SPRAY, SUSPENSION (ML) NASAL DAILY
COMMUNITY
Start: 2025-01-07

## 2025-01-27 RX ORDER — TERCONAZOLE 4 MG/G
1 CREAM VAGINAL NIGHTLY
Qty: 7 EACH | Refills: 0 | Status: SHIPPED | OUTPATIENT
Start: 2025-01-27 | End: 2025-02-03

## 2025-01-27 NOTE — PROGRESS NOTES
Chyna Frost is a 49 year old female  Patient's last menstrual period was 07/10/2023.   Chief Complaint   Patient presents with    Gyn Exam     Pt presents due to L pelvic pain and vaginal discharge     Pt states  has papilloma.   OBSTETRICS HISTORY:     OB History    Para Term  AB Living   3 2 2   1     SAB IAB Ectopic Multiple Live Births   1              # Outcome Date GA Lbr Santhosh/2nd Weight Sex Type Anes PTL Lv   3 Term      CS-LTranv      2 SAB 2010           1 Term      Vag-Spont          GYNE HISTORY:     Hx Prior Abnormal Pap: Yes (HPV 20 years ago)  Pap Date: 24  Pap Result Notes: Normal   Menarche: 13 (2025 11:34 AM)  Period Cycle (Days): Hysterectomy (2025 11:34 AM)  Use of Birth Control (if yes, specify type): Hysterectomy (2025 11:34 AM)  Hx Prior Abnormal Pap: Yes (HPV 20 years ago) (2025 11:34 AM)  Pap Date: 24 (2025 11:34 AM)  Pap Result Notes: Normal (2025 11:34 AM)        Latest Ref Rng & Units 4/3/2024    10:25 AM   RECENT PAP RESULTS   INTERPRETATION/RESULT: Negative for intraepithelial lesion or malignancy Negative for intraepithelial lesion or malignancy    HPV Negative Negative          MEDICAL HISTORY:     Past Medical History:    History of blood transfusion    age 3. No reaction       SURGICAL HISTORY:     Past Surgical History:   Procedure Laterality Date     delivery only      Hysterectomy  2023    Robotic total laparoscopic hysterectomy with bilateral salpingectomy    Tonsillectomy         SOCIAL HISTORY:     Social History     Socioeconomic History    Marital status: Single   Tobacco Use    Smoking status: Never     Passive exposure: Never    Smokeless tobacco: Never   Vaping Use    Vaping status: Never Used   Substance and Sexual Activity    Alcohol use: Never    Drug use: Never     Social Drivers of Health     Financial Resource Strain: Not on File (10/6/2022)    Received from  [facility]    Financial Resource Strain     Financial Resource Strain: 0   Food Insecurity: Not on File (2024)    Received from [facility]    Food Insecurity     Food: 0   Transportation Needs: Not on File (10/6/2022)    Received from [facility]    Transportation Needs     Transportation: 0   Physical Activity: Not on File (10/6/2022)    Received from [facility]    Physical Activity     Physical Activity: 0   Stress: Not on File (10/6/2022)    Received from [facility]    Stress     Stress: 0   Social Connections: Not on File (2024)    Received from [facility]    Social Connections     Connectedness: 0   Housing Stability: Not on File (10/6/2022)    Received from [facility]    Housing Stability     Housin        FAMILY HISTORY:     Family History   Problem Relation Age of Onset    No Known Problems Father     Diabetes Mother        MEDICATIONS:       Current Outpatient Medications:     teraconazole 0.4 % Vaginal Cream, Place 1 applicator vaginally nightly for 7 days., Disp: 7 each, Rfl: 0    Multiple Vitamin (MULTIVITAMIN ADULT OR), Take 1 tablet by mouth daily., Disp: , Rfl:     fluticasone propionate 50 MCG/ACT Nasal Suspension, 1 spray by Nasal route daily. (Patient not taking: Reported on 2025), Disp: , Rfl:     ALLERGIES:     Allergies[1]      REVIEW OF SYSTEMS:     Constitutional:    denies fever / chills  Eyes:     denies blurred or double vision  Cardiovascular:  denies chest pain or palpitations  Respiratory:    denies shortness of breath  Gastrointestinal:  denies severe abdominal pain, frequent diarrhea or constipation, nausea / vomiting  Genitourinary:    denies dysuria, bothersome incontinence  Skin/Breast:   denies any breast pain, lumps, or discharge  Neurological:    denies frequent severe headaches  Psychiatric:   denies depression or anxiety, thoughts of harming self or others  Heme/Lymph:    denies easy bruising or bleeding      PHYSICAL EXAM:   Blood pressure 128/86, pulse 84,  height 5' 1\" (1.549 m), weight 144 lb (65.3 kg), last menstrual period 07/10/2023, not currently breastfeeding.  Constitutional:  well developed, well nourished  Head/Face:  normocephalic  Neck/Thyroid: thyroid symmetric, no thyromegaly, no nodules, no adenopathy  Lymphatic: no abnormal supraclavicular or axillary adenopathy is noted  Respiratory:      chest wall symmetric and nontender on palpation, clear to asculation bilateral, no wheezing, rales, ronchi, and resonance normal upon percussion  Cardiovascular: chest normal in appearance, regular rate and rhythm, no murmurs, PMI palpated midclavicular line  Breast:   normal bilaterally without palpable masses, tenderness, asymmetry, nipple discharge, nipple retraction or skin changes bilaterally  Abdomen:   soft, nontender, nondistended, no masses  Skin/Hair:  no unusual rashes or bruises  Extremities:  no edema, no cyanosis, non tender bilaterally  Psychiatric:   oriented to time, place, person and situation. Appropriate mood and affect    Pelvic Exam:  External Genitalia:  normal appearance, hair distribution, and no lesions  Urethral Meatus:   normal in size, location, without lesions   Bladder:    no fullness, masses or tenderness  Vagina:    normal appearance without lesions, moderate thick white discharge, adherent, ph <4.5        ASSESSMENT & PLAN:     Chyna was seen today for gyn exam.    Diagnoses and all orders for this visit:    Vaginal candidiasis  -     teraconazole 0.4 % Vaginal Cream; Place 1 applicator vaginally nightly for 7 days.      Plan: ter 7  No condyloma    FOLLOW-UP     No follow-ups on file.      Yannick Tobar MD  1/27/2025       [1] No Known Allergies

## 2025-06-24 ENCOUNTER — OFFICE VISIT (OUTPATIENT)
Dept: OBGYN CLINIC | Facility: CLINIC | Age: 50
End: 2025-06-24

## 2025-06-24 VITALS — HEIGHT: 61 IN | SYSTOLIC BLOOD PRESSURE: 112 MMHG | DIASTOLIC BLOOD PRESSURE: 75 MMHG | BODY MASS INDEX: 27 KG/M2

## 2025-06-24 DIAGNOSIS — Z12.31 BREAST CANCER SCREENING BY MAMMOGRAM: ICD-10-CM

## 2025-06-24 DIAGNOSIS — Z01.419 NORMAL GYNECOLOGIC EXAMINATION: Primary | ICD-10-CM

## 2025-06-24 PROCEDURE — 99396 PREV VISIT EST AGE 40-64: CPT | Performed by: OBSTETRICS & GYNECOLOGY

## 2025-06-24 NOTE — PROGRESS NOTES
Chyna Frost is a 50 year old female  Patient's last menstrual period was 07/10/2023.   Chief Complaint   Patient presents with    Gyn Exam     Vaginal bleeding , has ovaries      Pt for annual exam. Pt c/o bleeding vaginal.   OBSTETRICS HISTORY:     OB History    Para Term  AB Living   3 2 2  1    SAB IAB Ectopic Multiple Live Births   1          # Outcome Date GA Lbr Santhosh/2nd Weight Sex Type Anes PTL Lv   3 Term      CS-LTranv      2 SAB            1 Term      Vag-Spont          GYNE HISTORY:         Menarche: 13 (2025 11:34 AM)  Period Cycle (Days): Hysterectomy (2025 11:34 AM)  Use of Birth Control (if yes, specify type): Hysterectomy (2025 11:34 AM)  Hx Prior Abnormal Pap: Yes (HPV 20 years ago) (2025 11:34 AM)  Pap Date: 24 (2025 11:34 AM)  Pap Result Notes: Normal (2025 11:34 AM)        Latest Ref Rng & Units 4/3/2024    10:25 AM   RECENT PAP RESULTS   INTERPRETATION/RESULT: Negative for intraepithelial lesion or malignancy Negative for intraepithelial lesion or malignancy    HPV Negative Negative          MEDICAL HISTORY:     Past Medical History[1]    SURGICAL HISTORY:     Past Surgical History[2]    SOCIAL HISTORY:     Short Social Hx on File[3]     FAMILY HISTORY:     Family History[4]    MEDICATIONS:     Medications - Current[5]    ALLERGIES:     Allergies[6]      REVIEW OF SYSTEMS:     Constitutional:    denies fever / chills  Eyes:     denies blurred or double vision  Cardiovascular:  denies chest pain or palpitations  Respiratory:    denies shortness of breath  Gastrointestinal:  denies severe abdominal pain, frequent diarrhea or constipation, nausea / vomiting  Genitourinary:    denies dysuria, bothersome incontinence  Skin/Breast:   denies any breast pain, lumps, or discharge  Neurological:    denies frequent severe headaches  Psychiatric:   denies depression or anxiety, thoughts of harming self or  others  Heme/Lymph:    denies easy bruising or bleeding      PHYSICAL EXAM:   Blood pressure 112/75, height 5' 1\" (1.549 m), last menstrual period 07/10/2023, not currently breastfeeding.  Constitutional:  well developed, well nourished  Head/Face:  normocephalic  Neck/Thyroid: thyroid symmetric, no thyromegaly, no nodules, no adenopathy  Lymphatic: no abnormal supraclavicular or axillary adenopathy is noted  Respiratory:      chest wall symmetric and nontender on palpation, clear to asculation bilateral, no wheezing, rales, ronchi, and resonance normal upon percussion  Cardiovascular: chest normal in appearance, regular rate and rhythm, no murmurs, PMI palpated midclavicular line  Breast:   normal bilaterally without palpable masses, tenderness, asymmetry, nipple discharge, nipple retraction or skin changes bilaterally  Abdomen:   soft, nontender, nondistended, no masses  Skin/Hair:  no unusual rashes or bruises  Extremities:  no edema, no cyanosis, non tender bilaterally  Psychiatric:   oriented to time, place, person and situation. Appropriate mood and affect    Pelvic Exam:  External Genitalia:  normal appearance, hair distribution, and no lesions  Urethral Meatus:   normal in size, location, without lesions   Bladder:    no fullness, masses or tenderness  Vagina:    normal appearance without lesions, no abnormal discharge, no bleeding, no granulation tissue  Adnexa:   normal without masses or tenderness on sve  Perineum:   normal  Anus: no hemorroids         ASSESSMENT & PLAN:     Chyna was seen today for gyn exam.    Diagnoses and all orders for this visit:    Normal gynecologic examination  -     ThinPrep Pap with HPV Reflex, Chlamydia/GC; Future  -     Chlamydia/Gc Amplification; Future  Nutrition, weight screening and exercise were discussed with the patient.  Exercise should encompass approximately 150 minutes/week.  Self breast exam counseling was also given.  I advised the patient to avoid tobacco,  drugs and alcohol.  Influenza vaccine was offered if seasonally appropriate.  HPV and STD prevention counseling was given.  Health maintenance checklist  was reviewed including Pap smear, cervical cultures, and mammogram screening if age-appropriate.  Appropriate follow-up scheduling was discussed with the patient.    Pap done cuff  Breast cancer screening by mammogram  -     [facility] EDUARDO 2D+3D SCREENING BILAT (CPT=77067/54396); Future          FOLLOW-UP     Return in about 1 year (around 2026) for Annual exam.      Yannick Tobar MD  2025       [1]   Past Medical History:   History of blood transfusion    age 3. No reaction   [2]   Past Surgical History:  Procedure Laterality Date     delivery only      Hysterectomy  2023    Robotic total laparoscopic hysterectomy with bilateral salpingectomy    Tonsillectomy     [3]   Social History  Socioeconomic History    Marital status: Single   Tobacco Use    Smoking status: Never     Passive exposure: Never    Smokeless tobacco: Never   Vaping Use    Vaping status: Never Used   Substance and Sexual Activity    Alcohol use: Never    Drug use: Never     Social Drivers of Health     Food Insecurity: Not on File (2024)    Received from [employer]    Food Insecurity     Food: 0   Transportation Needs: Not on File (10/6/2022)    Received from [employer]    Transportation Needs     Transportation: 0   Stress: Not on File (10/6/2022)    Received from [employer]    Stress     Stress: 0   Housing Stability: Not on File (10/6/2022)    Received from [employer]    Housing Stability     Housin   [4]   Family History  Problem Relation Age of Onset    No Known Problems Father     Diabetes Mother    [5]   Current Outpatient Medications:     Multiple Vitamin (MULTIVITAMIN ADULT OR), Take 1 tablet by mouth daily., Disp: , Rfl:     fluticasone propionate 50 MCG/ACT Nasal Suspension, 1 spray by Nasal route daily. (Patient not taking: Reported on 2025), Disp: , Rfl:   [6] No Known  Allergies

## 2025-06-25 LAB
C TRACH DNA SPEC QL NAA+PROBE: NEGATIVE
N GONORRHOEA DNA SPEC QL NAA+PROBE: NEGATIVE

## 2025-06-27 LAB — HPV E6+E7 MRNA CVX QL NAA+PROBE: NEGATIVE

## (undated) DEVICE — SOLUTION IRRIG 1000ML 0.9% NACL USP BTL

## (undated) DEVICE — STERILE SURGICAL LUBRICANT, METAL TUBE: Brand: SURGILUBE

## (undated) DEVICE — 60 ML SYRINGE CATHETER TIP: Brand: MONOJECT

## (undated) DEVICE — AIRSEAL 8 MM ACCESS PORT AND LOW PROFILE OBTURATOR WITH BLADELESS OPTICAL TIP, 120 MM LENGTH: Brand: AIRSEAL

## (undated) DEVICE — SUT CHRM GUT 3-0 27IN SH ABSRB UD 26MM 1/2

## (undated) DEVICE — STANDARD HYPODERMIC NEEDLE,POLYPROPYLENE HUB: Brand: MONOJECT

## (undated) DEVICE — CANNULA SEAL

## (undated) DEVICE — LAPAROVUE VISIBILITY SYSTEM LAPAROSCOPIC SOLUTIONS: Brand: LAPAROVUE

## (undated) DEVICE — FENESTRATED BIPOLAR FORCEPS: Brand: ENDOWRIST

## (undated) DEVICE — GLOVE SUR 7 SENSICARE PI MIC PIP CRM PWD F

## (undated) DEVICE — INTENDED FOR TISSUE SEPARATION, AND OTHER PROCEDURES THAT REQUIRE A SHARP SURGICAL BLADE TO PUNCTURE OR CUT.: Brand: BARD-PARKER ® STAINLESS STEEL BLADES

## (undated) DEVICE — MEDI-VAC NON-CONDUCTIVE SUCTION TUBING: Brand: CARDINAL HEALTH

## (undated) DEVICE — SUT MONOCRYL 5-0 PS-2 Y495G

## (undated) DEVICE — 12 ML SYRINGE LUER-LOCK TIP: Brand: MONOJECT

## (undated) DEVICE — COLUMN DRAPE

## (undated) DEVICE — RUMI TIP BLUE 6.7MM X 8CM

## (undated) DEVICE — SOL NACL IRRIG 0.9% 1000ML BTL

## (undated) DEVICE — TRAY SURESTEP 16 BARDEX DRAIN

## (undated) DEVICE — BLADELESS OBTURATOR: Brand: WECK VISTA

## (undated) DEVICE — VESSEL SEALER EXTEND: Brand: ENDOWRIST

## (undated) DEVICE — TIP COVER ACCESSORY

## (undated) DEVICE — INSUFFLATION NEEDLE TO ESTABLISH PNEUMOPERITONEUM.: Brand: INSUFFLATION NEEDLE

## (undated) DEVICE — ARM DRAPE

## (undated) DEVICE — DRAPE SHEET LAVH 124X112X30

## (undated) DEVICE — SUCTION CANISTER, 3000CC,SAFELINER: Brand: DEROYAL

## (undated) DEVICE — SKIN PREP TRAY 4 COMPARTM TRAY: Brand: MEDLINE INDUSTRIES, INC.

## (undated) DEVICE — DERMABOND CLOSURE 0.7ML TOPICL

## (undated) DEVICE — ROBOTIC: Brand: MEDLINE INDUSTRIES, INC.

## (undated) DEVICE — GAMMEX® PI HYBRID SIZE 7, STERILE POWDER-FREE SURGICAL GLOVE, POLYISOPRENE AND NEOPRENE BLEND: Brand: GAMMEX

## (undated) DEVICE — SUT VICRYL 3-0 SH J416H

## (undated) DEVICE — C-ARM: Brand: UNBRANDED

## (undated) DEVICE — YANKAUER,POOLE TIP,STERILE,50/CS: Brand: MEDLINE

## (undated) DEVICE — ABSORBABLE WOUND CLOSURE DEVICE: Brand: V-LOC 90

## (undated) DEVICE — SYSTEM SURGYPAD VELCRO 36IN

## (undated) DEVICE — SOLUTION  .9 3000ML

## (undated) DEVICE — ELECTRODE ES RET 2 PATE W/ 10FT CRD MPLR DISP

## (undated) DEVICE — TRI-LUMEN FILTERED TUBE SET WITH ACTIVATED CHARCOAL FILTER: Brand: AIRSEAL

## (undated) DEVICE — PACK CUSTOM D

## (undated) DEVICE — PAD ALC 43.5X24IN PREP  LF

## (undated) DEVICE — MEGA SUTURECUT ND: Brand: ENDOWRIST

## (undated) DEVICE — RUMI KOH-EFFICIENT 4.0CM

## (undated) NOTE — LETTER
MINOR CASE LETTER      4/4/2024        Dear Chyna,    Your are having a  left bartholins marsupialization  on 5/7/2024 at 11am.    Do not eat or drink anything (including water) after midnight the night before surgery.    If your procedure is scheduled later in the day, then nothing by mouth for 6 hours before arrival to the hospital.    You are to call this office if you have any cold or flu symptoms 2 days before your scheduled surgery.    Please avoid aspirin 7 days before surgery.  Avoid Ibuprofen, Motrin, Aleve, or Naprosyn for 3 days before surgery.    You will be contacted by PreAdmission Testing (PAT) usually within the week before surgery.  They will take a short medical history and let you know if any preoperative testing is needed.        Contact your insurance company to let them know you are having a  left bartholins marsupialization  done.   Call our office if any pre-certification or pre-authorization is required.  If you have an HMO or EPO insurance, we will initiate the referral for you.    Please make arrangements for someone to drive you home after your surgery.    Call our office now to schedule your post-operative appointment for 1/2 weeks after surgery.    Please feel free to contact our office at [phone] if you have any questions regarding these instructions or your procedure.      Sincerely,          Yannick Tobar MD  [facility], [facility] - OB/GYN  [address]  [facility] [id number] [phone]

## (undated) NOTE — LETTER
24          Chyna Frost  :  3/1/1975      To Whom It May Concern:    Patient was seen at the [facility] on 24. Patient's surgery was cancelled.      If this office may be of further assistance, please do not hesitate to contact us.      Sincerely,  [facility]

## (undated) NOTE — MR AVS SNAPSHOT
After Visit Summary   4/3/2024    Chyna Frost   MRN: [id number]           Visit Information     Date & Time  4/3/2024 10:30 AM Provider  Yannick Tobar MD Department  [facility] - OB/GYN Dept. Phone  [phone]      Your Vitals Were  Most recent update: 4/3/2024 10:10 AM    BP   121/72 (BP Location: Left arm, Patient Position: Sitting, Cuff Size: adult)    Pulse   82    Wt   153 lb    LMP   07/10/2023    BMI   28.91 kg/m²         Allergies as of 4/3/2024  Review status set to Review Complete on 4/3/2024   No Known Allergies     Diagnoses for This Visit    Normal gynecologic examination   [[id number]]  -  Primary  Breast cancer screening by mammogram   [[id number]]    Cyst of left Bartholin's gland   [[id number]]             Follow-up    Return in about 1 year (around 4/3/2025) for Annual exam.     We Ordered the Following     Normal Orders This Visit    Chlamydia/Gc Amplification [[id number] CUSTOM]     ThinPrep PAP with HPV Reflex Request [[id number] CUSTOM]     ThinPrep Pap with HPV Reflex, Chlamydia/GC [[id number] CUSTOM]     Future Labs/Procedures Expected by Expires    Chlamydia/Gc Amplification [[id number] CUSTOM]  4/3/2024 (Approximate) 4/3/2025    [facility] EDUARDO 2D+3D SCREENING BILAT (CPT=77067/68667) [COMBO CPT(R)]  4/3/2024 (Approximate) 4/3/2025    ThinPrep Pap with HPV Reflex, Chlamydia/GC [[id number] CUSTOM]  4/3/2024 4/3/2025      Follow-up Instructions    Return in about 1 year (around 4/3/2025) for Annual exam.     Imaging Scheduling Instructions     Around April 3, 2024   Imaging:   [facility] EDUARDO 2D+3D SCREENING BILAT (CPT=77067/91366)    Instructions: Your order will generate a \"Scheduling Ticket\" that will be available in [employer] to schedule on your own at a time most convenient to you.      If you do not have a [employer] Account, or if you prefer to speak with someone to schedule your appointment, please call [facility] Scheduling at  [phone].         April 5, 2024      Chyna Marychuycyndi Patton Frost   [address]  [address]  [facility] [address]     Dear Chyna :    Thank you for enrolling in [employer]. Please follow the instructions below to securely access your online medical record. [employer] allows you to send messages to your doctor, view test results, renew prescriptions and request appointments.    How Do I Sign Up?  1. In your Internet browser, go to [email or web address]  2. Click on the Activate your Account if you have an activation code in the box under the *New User? section.   3. Enter your [employer] Activation Code exactly as it appears below. You will not need to use this code after you have completed the sign-up process. If you do not sign up before the expiration date, you must request a new code.    [employer] Activation Code: [email or web address]  Expires: 5/11/2024  9:19 AM    4. Enter your Zip Code and Date of Birth (mm/dd/yyyy) as indicated and click Next. You will be taken to the next sign-up page.  5. Create a [employer] Username. This will be your [employer] login Username and cannot be changed, so think of one that is secure and easy to remember.  6. Create a [employer] password. You can change your password at any time.  7. Choose a Security Question and enter your Answer and click Next. This can be used at a later time if you forget your password.   8. Enter your e-mail address. You will receive e-mail notification when new information is available in [employer].  9. Click Sign In. You can now view your medical record.    Additional Information  If you have questions, you can call [phone] to talk to our [employer] staff. Remember, [employer] is NOT to be used for urgent needs. For medical emergencies, dial 911.    Sincerely,    Yannick Tobar MD              Did you know that [facility] [place] now offer Video Visits through [employer] for adult patients for a variety of conditions such as allergies, back pain  and cold symptoms? Skip the drive and waiting room and online chat with a doctor face-to-face using your web-cam enabled computer or mobile device wherever you are. Video Visits cost $50 and can be paid hassle-free using a credit, debit, or health savings card.  Not active on [employer]? Ask us how to get signed up today!          If you receive a survey from [employer], please take a few minutes to complete it and provide feedback. We strive to deliver the best patient experience and are looking for ways to make improvements. Your feedback will help us do so. For more information on [employer], please visit [email or web address]           No text in SmartText           No text in SmartText

## (undated) NOTE — LETTER
AUTHORIZATION FOR SURGICAL OPERATION OR OTHER PROCEDURE    1. I hereby authorize Dr. Rosaura Winston, and [facility] staff assigned to my case to perform the following operation and/or procedure at the [employer] [facility]:  Endometrial Biopsy. 2.  My physician has explained the nature and purpose of the operation or other procedure, possible alternative methods of treatment, the risks involved, and the possibility of complication to me. I acknowledge that no guarantee has been made as to the result that may be obtained. 3.  I recognize that, during the course of this operation, or other procedure, unforseen conditions may necessitate additional or different procedure than those listed above. I, therefore, further authorize and request that the above named physician, his/her physician assistants or designees perform such procedures as are, in his/her professional opinion, necessary and desirable. 4.  Any tissue or organs removed in the operation or other procedure may be disposed of by and at the discretion of the [place] [employer] [facility] and [facility]. 5.  I understand that in the event of a medical emergency, I will be transported by local paramedics to [facility] or other hospital emergency department. 6.  I certify that I have read and fully understand the above consent to operation and/or other procedure. 7.  I acknowledge that my physician has explained sedation/analgesia administration to me including the risks and benefits. I consent to the administration of sedation/analgesia as may be necessary or desirable in the judgement of my physician. Witness signature: ___________________________________________________ Date:  ______/______/_____                    Time:  ________ A. M.  P.M.        Patient Name:  ______________________________________________________  (please print)      Patient signature: ___________________________________________________             Relationship to Patient:           []  Parent    Responsible person                          []  Spouse  In case of minor or                    [] Other  _____________   Incompetent name:  __________________________________________________                               (please print)      _____________      Responsible person  In case of minor or  Incompetent signature:  _______________________________________________    Statement of Physician  My signature below affirms that prior to the time of the procedure, I have explained to the patient and/or his/her guardian, the risks and benefits involved in the proposed treatment and any reasonable alternative to the proposed treatment. I have also explained the risks and benefits involved in the refusal of the proposed treatment and have answered the patient's questions.                         Date:  ______/______/_______  Provider                      Signature:  __________________________________________________________       Time:  ___________ A.M    P.M.